# Patient Record
Sex: FEMALE | Race: OTHER | Employment: UNEMPLOYED | URBAN - METROPOLITAN AREA
[De-identification: names, ages, dates, MRNs, and addresses within clinical notes are randomized per-mention and may not be internally consistent; named-entity substitution may affect disease eponyms.]

---

## 2017-01-15 ENCOUNTER — APPOINTMENT (OUTPATIENT)
Dept: GENERAL RADIOLOGY | Age: 77
End: 2017-01-15
Attending: EMERGENCY MEDICINE
Payer: MEDICARE

## 2017-01-15 ENCOUNTER — HOSPITAL ENCOUNTER (OUTPATIENT)
Age: 77
Setting detail: OBSERVATION
Discharge: HOME | End: 2017-01-19
Attending: EMERGENCY MEDICINE | Admitting: HOSPITALIST
Payer: MEDICARE

## 2017-01-15 ENCOUNTER — APPOINTMENT (OUTPATIENT)
Dept: CT IMAGING | Age: 77
End: 2017-01-15
Attending: EMERGENCY MEDICINE
Payer: MEDICARE
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
## 2017-01-15 DIAGNOSIS — M17.11 OSTEOARTHRITIS OF RIGHT KNEE, UNSPECIFIED OSTEOARTHRITIS TYPE: ICD-10-CM

## 2017-01-15 DIAGNOSIS — M25.461 KNEE EFFUSION, RIGHT: ICD-10-CM

## 2017-01-15 PROBLEM — R26.89 IMPAIRED GAIT AND MOBILITY: Status: ACTIVE | Noted: 2017-01-15

## 2017-01-15 PROBLEM — I10 ESSENTIAL HYPERTENSION: Status: ACTIVE | Noted: 2017-01-15

## 2017-01-15 PROBLEM — M25.561 ACUTE PAIN OF RIGHT KNEE: Status: ACTIVE | Noted: 2017-01-15

## 2017-01-15 LAB
ALBUMIN SERPL BCP-MCNC: 3.5 G/DL (ref 3.4–5)
ALBUMIN SERPL BCP-MCNC: 4 G/DL (ref 3.4–5)
ALBUMIN/GLOB SERPL: 0.8 {RATIO} (ref 0.8–1.7)
ALBUMIN/GLOB SERPL: 1.1 {RATIO} (ref 0.8–1.7)
ALP SERPL-CCNC: 114 U/L (ref 45–117)
ALP SERPL-CCNC: 118 U/L (ref 45–117)
ALT SERPL-CCNC: 38 U/L (ref 13–56)
ALT SERPL-CCNC: 71 U/L (ref 13–56)
AMYLASE SERPL-CCNC: 23 U/L (ref 25–115)
ANION GAP BLD CALC-SCNC: 9 MMOL/L (ref 3–18)
ANION GAP BLD CALC-SCNC: 9 MMOL/L (ref 3–18)
APPEARANCE UR: ABNORMAL
AST SERPL W P-5'-P-CCNC: 25 U/L (ref 15–37)
AST SERPL W P-5'-P-CCNC: 58 U/L (ref 15–37)
BACTERIA URNS QL MICRO: ABNORMAL /HPF
BASOPHILS # BLD AUTO: 0 K/UL (ref 0–0.1)
BASOPHILS # BLD: 0 % (ref 0–2)
BILIRUB DIRECT SERPL-MCNC: 0.2 MG/DL (ref 0–0.2)
BILIRUB SERPL-MCNC: 0.6 MG/DL (ref 0.2–1)
BILIRUB SERPL-MCNC: 0.6 MG/DL (ref 0.2–1)
BILIRUB UR QL: NEGATIVE
BUN SERPL-MCNC: 15 MG/DL (ref 7–18)
BUN SERPL-MCNC: 16 MG/DL (ref 7–18)
BUN/CREAT SERPL: 22 (ref 12–20)
BUN/CREAT SERPL: 24 (ref 12–20)
CALCIUM SERPL-MCNC: 9.5 MG/DL (ref 8.5–10.1)
CALCIUM SERPL-MCNC: 9.6 MG/DL (ref 8.5–10.1)
CHLORIDE SERPL-SCNC: 107 MMOL/L (ref 100–108)
CHLORIDE SERPL-SCNC: 111 MMOL/L (ref 100–108)
CK MB CFR SERPL CALC: ABNORMAL % (ref 0–4)
CK MB SERPL-MCNC: <0.5 NG/ML (ref 0.5–3.6)
CK SERPL-CCNC: 36 U/L (ref 26–192)
CO2 SERPL-SCNC: 21 MMOL/L (ref 21–32)
CO2 SERPL-SCNC: 23 MMOL/L (ref 21–32)
COLOR UR: YELLOW
CREAT SERPL-MCNC: 0.66 MG/DL (ref 0.6–1.3)
CREAT SERPL-MCNC: 0.68 MG/DL (ref 0.6–1.3)
DIFFERENTIAL METHOD BLD: ABNORMAL
EOSINOPHIL # BLD: 0.1 K/UL (ref 0–0.4)
EOSINOPHIL NFR BLD: 1 % (ref 0–5)
EPITH CASTS URNS QL MICRO: ABNORMAL /LPF (ref 0–5)
ERYTHROCYTE [DISTWIDTH] IN BLOOD BY AUTOMATED COUNT: 15.2 % (ref 11.6–14.5)
GLOBULIN SER CALC-MCNC: 3.5 G/DL (ref 2–4)
GLOBULIN SER CALC-MCNC: 4.4 G/DL (ref 2–4)
GLUCOSE SERPL-MCNC: 133 MG/DL (ref 74–99)
GLUCOSE SERPL-MCNC: 136 MG/DL (ref 74–99)
GLUCOSE UR STRIP.AUTO-MCNC: NEGATIVE MG/DL
HCT VFR BLD AUTO: 36.9 % (ref 35–45)
HGB BLD-MCNC: 12.5 G/DL (ref 12–16)
HGB UR QL STRIP: ABNORMAL
INR PPP: 1 (ref 0.8–1.2)
KETONES UR QL STRIP.AUTO: NEGATIVE MG/DL
LEUKOCYTE ESTERASE UR QL STRIP.AUTO: ABNORMAL
LIPASE SERPL-CCNC: 90 U/L (ref 73–393)
LYMPHOCYTES # BLD AUTO: 13 % (ref 21–52)
LYMPHOCYTES # BLD: 1.3 K/UL (ref 0.9–3.6)
MCH RBC QN AUTO: 26.9 PG (ref 24–34)
MCHC RBC AUTO-ENTMCNC: 33.9 G/DL (ref 31–37)
MCV RBC AUTO: 79.4 FL (ref 74–97)
MONOCYTES # BLD: 0.8 K/UL (ref 0.05–1.2)
MONOCYTES NFR BLD AUTO: 8 % (ref 3–10)
MUCOUS THREADS URNS QL MICRO: ABNORMAL /LPF
NEUTS SEG # BLD: 7.4 K/UL (ref 1.8–8)
NEUTS SEG NFR BLD AUTO: 78 % (ref 40–73)
NITRITE UR QL STRIP.AUTO: POSITIVE
PH UR STRIP: 5.5 [PH] (ref 5–8)
PLATELET # BLD AUTO: 257 K/UL (ref 135–420)
PMV BLD AUTO: 10 FL (ref 9.2–11.8)
POTASSIUM SERPL-SCNC: 3.9 MMOL/L (ref 3.5–5.5)
POTASSIUM SERPL-SCNC: 3.9 MMOL/L (ref 3.5–5.5)
PROT SERPL-MCNC: 7.5 G/DL (ref 6.4–8.2)
PROT SERPL-MCNC: 7.9 G/DL (ref 6.4–8.2)
PROT UR STRIP-MCNC: ABNORMAL MG/DL
PROTHROMBIN TIME: 12.8 SEC (ref 11.5–15.2)
RBC # BLD AUTO: 4.65 M/UL (ref 4.2–5.3)
RBC #/AREA URNS HPF: ABNORMAL /HPF (ref 0–5)
SODIUM SERPL-SCNC: 139 MMOL/L (ref 136–145)
SODIUM SERPL-SCNC: 141 MMOL/L (ref 136–145)
SP GR UR REFRACTOMETRY: 1.02 (ref 1–1.03)
TROPONIN I SERPL-MCNC: <0.02 NG/ML (ref 0–0.04)
UROBILINOGEN UR QL STRIP.AUTO: 1 EU/DL (ref 0.2–1)
WBC # BLD AUTO: 9.6 K/UL (ref 4.6–13.2)
WBC URNS QL MICRO: ABNORMAL /HPF (ref 0–4)

## 2017-01-15 PROCEDURE — 74011000258 HC RX REV CODE- 258: Performed by: HOSPITALIST

## 2017-01-15 PROCEDURE — 74011250636 HC RX REV CODE- 250/636: Performed by: EMERGENCY MEDICINE

## 2017-01-15 PROCEDURE — 85610 PROTHROMBIN TIME: CPT | Performed by: EMERGENCY MEDICINE

## 2017-01-15 PROCEDURE — 82550 ASSAY OF CK (CPK): CPT | Performed by: HOSPITALIST

## 2017-01-15 PROCEDURE — 73502 X-RAY EXAM HIP UNI 2-3 VIEWS: CPT

## 2017-01-15 PROCEDURE — 80076 HEPATIC FUNCTION PANEL: CPT | Performed by: HOSPITALIST

## 2017-01-15 PROCEDURE — 74011250637 HC RX REV CODE- 250/637: Performed by: EMERGENCY MEDICINE

## 2017-01-15 PROCEDURE — 96361 HYDRATE IV INFUSION ADD-ON: CPT

## 2017-01-15 PROCEDURE — 74011250637 HC RX REV CODE- 250/637: Performed by: HOSPITALIST

## 2017-01-15 PROCEDURE — 99285 EMERGENCY DEPT VISIT HI MDM: CPT

## 2017-01-15 PROCEDURE — 96372 THER/PROPH/DIAG INJ SC/IM: CPT

## 2017-01-15 PROCEDURE — L1830 KO IMMOB CANVAS LONG PRE OTS: HCPCS

## 2017-01-15 PROCEDURE — 73562 X-RAY EXAM OF KNEE 3: CPT

## 2017-01-15 PROCEDURE — 93005 ELECTROCARDIOGRAM TRACING: CPT

## 2017-01-15 PROCEDURE — 74011250636 HC RX REV CODE- 250/636: Performed by: HOSPITALIST

## 2017-01-15 PROCEDURE — 83690 ASSAY OF LIPASE: CPT | Performed by: HOSPITALIST

## 2017-01-15 PROCEDURE — 96374 THER/PROPH/DIAG INJ IV PUSH: CPT

## 2017-01-15 PROCEDURE — 80048 BASIC METABOLIC PNL TOTAL CA: CPT | Performed by: HOSPITALIST

## 2017-01-15 PROCEDURE — 73700 CT LOWER EXTREMITY W/O DYE: CPT

## 2017-01-15 PROCEDURE — 99218 HC RM OBSERVATION: CPT

## 2017-01-15 PROCEDURE — 85025 COMPLETE CBC W/AUTO DIFF WBC: CPT | Performed by: EMERGENCY MEDICINE

## 2017-01-15 PROCEDURE — 80053 COMPREHEN METABOLIC PANEL: CPT | Performed by: EMERGENCY MEDICINE

## 2017-01-15 PROCEDURE — 82150 ASSAY OF AMYLASE: CPT | Performed by: HOSPITALIST

## 2017-01-15 PROCEDURE — 96375 TX/PRO/DX INJ NEW DRUG ADDON: CPT

## 2017-01-15 PROCEDURE — 81001 URINALYSIS AUTO W/SCOPE: CPT | Performed by: HOSPITALIST

## 2017-01-15 RX ORDER — HEPARIN SODIUM 5000 [USP'U]/ML
5000 INJECTION, SOLUTION INTRAVENOUS; SUBCUTANEOUS EVERY 8 HOURS
Status: DISCONTINUED | OUTPATIENT
Start: 2017-01-15 | End: 2017-01-19 | Stop reason: HOSPADM

## 2017-01-15 RX ORDER — ASPIRIN 325 MG
1 TABLET, DELAYED RELEASE (ENTERIC COATED) ORAL
COMMUNITY

## 2017-01-15 RX ORDER — MORPHINE SULFATE 4 MG/ML
4 INJECTION, SOLUTION INTRAMUSCULAR; INTRAVENOUS
Status: COMPLETED | OUTPATIENT
Start: 2017-01-15 | End: 2017-01-15

## 2017-01-15 RX ORDER — ONDANSETRON 4 MG/1
4 TABLET, ORALLY DISINTEGRATING ORAL
Status: DISCONTINUED | OUTPATIENT
Start: 2017-01-15 | End: 2017-01-19 | Stop reason: HOSPADM

## 2017-01-15 RX ORDER — MORPHINE SULFATE 2 MG/ML
2 INJECTION, SOLUTION INTRAMUSCULAR; INTRAVENOUS
Status: DISCONTINUED | OUTPATIENT
Start: 2017-01-15 | End: 2017-01-19 | Stop reason: HOSPADM

## 2017-01-15 RX ORDER — AMLODIPINE BESYLATE 10 MG/1
10 TABLET ORAL DAILY
Status: DISCONTINUED | OUTPATIENT
Start: 2017-01-15 | End: 2017-01-19 | Stop reason: HOSPADM

## 2017-01-15 RX ORDER — LORAZEPAM 0.5 MG/1
0.5 TABLET ORAL
Status: DISCONTINUED | OUTPATIENT
Start: 2017-01-15 | End: 2017-01-19 | Stop reason: HOSPADM

## 2017-01-15 RX ORDER — PRAVASTATIN SODIUM 20 MG/1
20 TABLET ORAL DAILY
Status: DISCONTINUED | OUTPATIENT
Start: 2017-01-15 | End: 2017-01-19 | Stop reason: HOSPADM

## 2017-01-15 RX ORDER — AMLODIPINE BESYLATE 10 MG/1
1 TABLET ORAL DAILY
COMMUNITY
Start: 2016-12-05

## 2017-01-15 RX ORDER — ATENOLOL 25 MG/1
1 TABLET ORAL DAILY
COMMUNITY
Start: 2016-11-01

## 2017-01-15 RX ORDER — ACETAMINOPHEN 500 MG
1000 TABLET ORAL
Status: DISCONTINUED | OUTPATIENT
Start: 2017-01-15 | End: 2017-01-19 | Stop reason: HOSPADM

## 2017-01-15 RX ORDER — SODIUM CHLORIDE 0.9 % (FLUSH) 0.9 %
5-10 SYRINGE (ML) INJECTION EVERY 8 HOURS
Status: DISCONTINUED | OUTPATIENT
Start: 2017-01-15 | End: 2017-01-19 | Stop reason: HOSPADM

## 2017-01-15 RX ORDER — DEXTROSE MONOHYDRATE AND SODIUM CHLORIDE 5; .9 G/100ML; G/100ML
125 INJECTION, SOLUTION INTRAVENOUS CONTINUOUS
Status: DISCONTINUED | OUTPATIENT
Start: 2017-01-15 | End: 2017-01-18

## 2017-01-15 RX ORDER — ONDANSETRON 2 MG/ML
4 INJECTION INTRAMUSCULAR; INTRAVENOUS
Status: DISCONTINUED | OUTPATIENT
Start: 2017-01-15 | End: 2017-01-19 | Stop reason: HOSPADM

## 2017-01-15 RX ORDER — ACETAMINOPHEN 500 MG
500 TABLET ORAL
COMMUNITY
End: 2017-01-19

## 2017-01-15 RX ORDER — LEVOTHYROXINE SODIUM 50 UG/1
50 TABLET ORAL
COMMUNITY

## 2017-01-15 RX ORDER — DICLOFENAC SODIUM 10 MG/G
2 GEL TOPICAL 2 TIMES DAILY
COMMUNITY

## 2017-01-15 RX ORDER — SENNOSIDES 8.6 MG/1
2 TABLET ORAL
Status: DISCONTINUED | OUTPATIENT
Start: 2017-01-15 | End: 2017-01-19 | Stop reason: HOSPADM

## 2017-01-15 RX ORDER — FLUTICASONE PROPIONATE 50 MCG
2 SPRAY, SUSPENSION (ML) NASAL DAILY
COMMUNITY
Start: 2016-11-01

## 2017-01-15 RX ORDER — ALENDRONATE SODIUM 70 MG/1
70 TABLET ORAL
COMMUNITY

## 2017-01-15 RX ORDER — DIPHENHYDRAMINE HCL 25 MG
25 CAPSULE ORAL
Status: DISCONTINUED | OUTPATIENT
Start: 2017-01-15 | End: 2017-01-19 | Stop reason: HOSPADM

## 2017-01-15 RX ORDER — TRAMADOL HYDROCHLORIDE 50 MG/1
50 TABLET ORAL
Status: DISCONTINUED | OUTPATIENT
Start: 2017-01-15 | End: 2017-01-19 | Stop reason: HOSPADM

## 2017-01-15 RX ORDER — OMEPRAZOLE 20 MG/1
20 CAPSULE, DELAYED RELEASE ORAL 2 TIMES DAILY
COMMUNITY

## 2017-01-15 RX ORDER — ASPIRIN 81 MG/1
81 TABLET ORAL DAILY
COMMUNITY

## 2017-01-15 RX ORDER — TRAMADOL HYDROCHLORIDE 50 MG/1
50 TABLET ORAL
Status: DISCONTINUED | OUTPATIENT
Start: 2017-01-15 | End: 2017-01-15

## 2017-01-15 RX ORDER — NALOXONE HYDROCHLORIDE 0.4 MG/ML
0.4 INJECTION, SOLUTION INTRAMUSCULAR; INTRAVENOUS; SUBCUTANEOUS AS NEEDED
Status: DISCONTINUED | OUTPATIENT
Start: 2017-01-15 | End: 2017-01-19 | Stop reason: HOSPADM

## 2017-01-15 RX ORDER — ASPIRIN 81 MG/1
81 TABLET ORAL DAILY
Status: DISCONTINUED | OUTPATIENT
Start: 2017-01-15 | End: 2017-01-19 | Stop reason: HOSPADM

## 2017-01-15 RX ORDER — LOSARTAN POTASSIUM 50 MG/1
100 TABLET ORAL DAILY
Status: DISCONTINUED | OUTPATIENT
Start: 2017-01-15 | End: 2017-01-19 | Stop reason: HOSPADM

## 2017-01-15 RX ORDER — PRAVASTATIN SODIUM 20 MG/1
1 TABLET ORAL DAILY
COMMUNITY
Start: 2016-10-25

## 2017-01-15 RX ORDER — LEVOTHYROXINE SODIUM 50 UG/1
50 TABLET ORAL
Status: DISCONTINUED | OUTPATIENT
Start: 2017-01-15 | End: 2017-01-19 | Stop reason: HOSPADM

## 2017-01-15 RX ORDER — PANTOPRAZOLE SODIUM 40 MG/1
40 TABLET, DELAYED RELEASE ORAL
Status: DISCONTINUED | OUTPATIENT
Start: 2017-01-15 | End: 2017-01-19 | Stop reason: HOSPADM

## 2017-01-15 RX ORDER — HEPARIN SODIUM 5000 [USP'U]/ML
5000 INJECTION, SOLUTION INTRAVENOUS; SUBCUTANEOUS EVERY 8 HOURS
Status: DISCONTINUED | OUTPATIENT
Start: 2017-01-15 | End: 2017-01-15

## 2017-01-15 RX ORDER — ATENOLOL 25 MG/1
25 TABLET ORAL DAILY
Status: DISCONTINUED | OUTPATIENT
Start: 2017-01-15 | End: 2017-01-19 | Stop reason: HOSPADM

## 2017-01-15 RX ORDER — SODIUM CHLORIDE 0.9 % (FLUSH) 0.9 %
5-10 SYRINGE (ML) INJECTION AS NEEDED
Status: DISCONTINUED | OUTPATIENT
Start: 2017-01-15 | End: 2017-01-19 | Stop reason: HOSPADM

## 2017-01-15 RX ORDER — LOSARTAN POTASSIUM 100 MG/1
1 TABLET ORAL DAILY
COMMUNITY
Start: 2016-10-13

## 2017-01-15 RX ORDER — HYDROCODONE BITARTRATE AND ACETAMINOPHEN 5; 325 MG/1; MG/1
2 TABLET ORAL
Status: COMPLETED | OUTPATIENT
Start: 2017-01-15 | End: 2017-01-15

## 2017-01-15 RX ADMIN — HEPARIN SODIUM 5000 UNITS: 5000 INJECTION, SOLUTION INTRAVENOUS; SUBCUTANEOUS at 15:49

## 2017-01-15 RX ADMIN — LOSARTAN POTASSIUM 100 MG: 25 TABLET, FILM COATED ORAL at 15:47

## 2017-01-15 RX ADMIN — HEPARIN SODIUM 5000 UNITS: 5000 INJECTION, SOLUTION INTRAVENOUS; SUBCUTANEOUS at 23:41

## 2017-01-15 RX ADMIN — ATENOLOL 25 MG: 50 TABLET ORAL at 14:41

## 2017-01-15 RX ADMIN — PANTOPRAZOLE SODIUM 40 MG: 40 TABLET, DELAYED RELEASE ORAL at 20:45

## 2017-01-15 RX ADMIN — SENNOSIDES 17.2 MG: 8.6 TABLET, FILM COATED ORAL at 23:41

## 2017-01-15 RX ADMIN — Medication 4 MG: at 04:12

## 2017-01-15 RX ADMIN — ASPIRIN 81 MG: 81 TABLET, COATED ORAL at 14:41

## 2017-01-15 RX ADMIN — Medication 10 ML: at 08:06

## 2017-01-15 RX ADMIN — HEPARIN SODIUM 5000 UNITS: 5000 INJECTION, SOLUTION INTRAVENOUS; SUBCUTANEOUS at 08:05

## 2017-01-15 RX ADMIN — PRAVASTATIN SODIUM 20 MG: 20 TABLET ORAL at 14:41

## 2017-01-15 RX ADMIN — HYDROCODONE BITARTRATE AND ACETAMINOPHEN 2 TABLET: 5; 325 TABLET ORAL at 06:00

## 2017-01-15 RX ADMIN — LEVOTHYROXINE SODIUM 50 MCG: 50 TABLET ORAL at 08:05

## 2017-01-15 RX ADMIN — Medication 10 ML: at 14:41

## 2017-01-15 RX ADMIN — ONDANSETRON 4 MG: 4 TABLET, ORALLY DISINTEGRATING ORAL at 10:10

## 2017-01-15 RX ADMIN — AMLODIPINE BESYLATE 10 MG: 10 TABLET ORAL at 14:41

## 2017-01-15 RX ADMIN — Medication 10 ML: at 23:41

## 2017-01-15 RX ADMIN — DEXTROSE MONOHYDRATE AND SODIUM CHLORIDE 125 ML/HR: 5; .9 INJECTION, SOLUTION INTRAVENOUS at 20:35

## 2017-01-15 RX ADMIN — ONDANSETRON HYDROCHLORIDE 4 MG: 2 SOLUTION INTRAMUSCULAR; INTRAVENOUS at 20:45

## 2017-01-15 NOTE — IP AVS SNAPSHOT
Current Discharge Medication List  
  
Take these medications at their scheduled times Dose & Instructions Dispensing Information Comments Morning Noon Evening Bedtime  
 alendronate 70 mg tablet Commonly known as:  FOSAMAX Your next dose is: Today, Tomorrow Other:  ____________ Dose:  70 mg Take 70 mg by mouth every seven (7) days. Refills:  0  
     
   
   
   
  
 amLODIPine 10 mg tablet Commonly known as:  Bri Colin Your next dose is: Today, Tomorrow Other:  ____________ Dose:  1 Tab Take 1 Tab by mouth daily. Refills:  0  
     
   
   
   
  
 aspirin delayed-release 81 mg tablet Your next dose is: Today, Tomorrow Other:  ____________ Dose:  81 mg Take 81 mg by mouth daily. Refills:  0  
     
   
   
   
  
 atenolol 25 mg tablet Commonly known as:  TENORMIN Your next dose is: Today, Tomorrow Other:  ____________ Dose:  1 Tab Take 1 Tab by mouth daily. Refills:  0 Cholecalciferol (Vitamin D3) 50,000 unit Cap Your next dose is: Today, Tomorrow Other:  ____________ Dose:  1 Cap Take 1 Cap by mouth every seven (7) days. Refills:  0  
     
   
   
   
  
 fluticasone 50 mcg/actuation nasal spray Commonly known as:  Caffie Euler Your next dose is: Today, Tomorrow Other:  ____________ Dose:  2 Spray 2 Sprays by Both Nostrils route daily. Refills:  0  
     
   
   
   
  
 levothyroxine 50 mcg tablet Commonly known as:  SYNTHROID Your next dose is: Today, Tomorrow Other:  ____________ Dose:  50 mcg Take 50 mcg by mouth Daily (before breakfast). Refills:  0  
     
   
   
   
  
 losartan 100 mg tablet Commonly known as:  COZAAR Your next dose is: Today, Tomorrow Other:  ____________ Dose:  1 Tab Take 1 Tab by mouth daily. Refills:  0 omeprazole 20 mg capsule Commonly known as:  PRILOSEC Your next dose is: Today, Tomorrow Other:  ____________ Dose:  20 mg Take 20 mg by mouth two (2) times a day. Refills:  0  
     
   
   
   
  
 pravastatin 20 mg tablet Commonly known as:  PRAVACHOL Your next dose is: Today, Tomorrow Other:  ____________ Dose:  1 Tab Take 1 Tab by mouth daily. Refills:  0  
     
   
   
   
  
 senna 8.6 mg tablet Commonly known as:  Adrian Moreira Your next dose is: Today, Tomorrow Other:  ____________ Dose:  2 Tab Take 2 Tabs by mouth nightly. Quantity:  60 Tab Refills:  0 VOLTAREN 1 % Gel Generic drug:  diclofenac Your next dose is: Today, Tomorrow Other:  ____________ Dose:  2 g Apply 2 g to affected area two (2) times a day. Indications: OSTEOARTHRITIS OF THE KNEE, left knee Refills:  0 Take these medications as needed Dose & Instructions Dispensing Information Comments Morning Noon Evening Bedtime  
 acetaminophen 500 mg tablet Commonly known as:  80 Jr Kiarra Holley Se Your next dose is: Today, Tomorrow Other:  ____________ Dose:  1000 mg Take 2 Tabs by mouth three (3) times daily as needed. Indications: ARTHRITIC PAIN Quantity:  60 Tab Refills:  0 LORazepam 0.5 mg tablet Commonly known as:  ATIVAN Your next dose is: Today, Tomorrow Other:  ____________ Dose:  0.5 mg Take 1 Tab by mouth every six (6) hours as needed. Max Daily Amount: 2 mg. Indications: ANXIETY Quantity:  30 Tab Refills:  0  
     
   
   
   
  
 ondansetron 4 mg disintegrating tablet Commonly known as:  ZOFRAN ODT Your next dose is: Today, Tomorrow Other:  ____________ Dose:  4 mg Take 1 Tab by mouth every eight (8) hours as needed. Quantity:  15 Tab Refills:  0  
     
   
   
   
  
 traMADol 50 mg tablet Commonly known as:  ULTRAM  
   
Your next dose is: Today, Tomorrow Other:  ____________ Dose:  50 mg Take 1 Tab by mouth every six (6) hours as needed. Max Daily Amount: 200 mg. Indications: PAIN Quantity:  30 Tab Refills:  0 Where to Get Your Medications Information about where to get these medications is not yet available ! Ask your nurse or doctor about these medications  
  acetaminophen 500 mg tablet LORazepam 0.5 mg tablet  
 ondansetron 4 mg disintegrating tablet  
 senna 8.6 mg tablet  
 traMADol 50 mg tablet

## 2017-01-15 NOTE — ED TRIAGE NOTES
Pt arrived via medic complaining complaining of right knee swelling and pain after falling down the stairs Friday night, patient denies LOC or Head Injury. Patient able to move her her right foot but reports that she is unable to bear weight on it.

## 2017-01-15 NOTE — H&P
History & Physical    Patient: Arias Duggan MRN: [id number]  [facility]: [id number]    YOB: 1940  Age: 77 y.o. Sex: female      DOA: 1/15/2017       HPI:     Arias Duggan is a 77 y.o. female with a past medical history of hypertension, hypothyroid and GERD presented to the emergency room in the a.m. of 1/15/2017 with a complaint of   pain on weightbearing right lower extremity. The patient states she fell down the steps 1/13/2017 at home. Ms. Aruna Negron denies that she had any dizziness, headache, SOB or chest pain. She states she had a trip fall and fell onto the steps. Denies head injury, flank pain, back or neck pain. She has had increased difficulty trying to walk, now unable to put weight on her right leg. She has no assistive devices at home, and cannot walk. On evaluation in emergency room the patient was found to have moderate tenderness at her right knee. A hip x-ray was done for referred pain, and demonstrated DDD of the lumbar spine and SI joints, no definite fracture. X-ray the right knee demonstrated a large dense suprapatellar joint effusion with a possible fracture along the lateral tibial plateau and tricompartmental osteoarthrosis with chondrocalcinosis. CT scan of the right knee demonstrated no evidence of definiteacute fracture. The appearance of a fracture on the x-rays appears to represent an unusual morphology of the posterior lateral tibial plateau with superimposed periarticular spurring. A large suprapatellar joint effusion is again mentioned with chondrocalcinosis. Past Medical History   Diagnosis Date    GERD (gastroesophageal reflux disease)     High cholesterol     Hypertension     Hypothyroid        History reviewed. No pertinent past surgical history. History reviewed. No pertinent family history.     Social History     Social History    Marital status:      Spouse name: N/A    Number of children: 4    Years of education: N/A     Social History Main Topics    Smoking status: Never Smoker    Smokeless tobacco: None    Alcohol use No    Drug use: No    Sexual activity: Not Asked     Other Topics Concern    None     Social History Narrative    Lives with Daughter in [place] to [facility] from [facility] 1980's    Retired    4 daughters       Prior to Admission medications    Medication Sig Start Date End Date Taking? Authorizing Provider   amLODIPine (NORVASC) 10 mg tablet Take 1 Tab by mouth daily. 12/5/16  Yes Historical Provider   atenolol (TENORMIN) 25 mg tablet Take 1 Tab by mouth daily. 11/1/16  Yes Historical Provider   losartan (COZAAR) 100 mg tablet Take 1 Tab by mouth daily. 10/13/16  Yes Historical Provider   pravastatin (PRAVACHOL) 20 mg tablet Take 1 Tab by mouth daily. 10/25/16  Yes Historical Provider   aspirin delayed-release 81 mg tablet Take 81 mg by mouth daily. Yes Historical Provider   levothyroxine (SYNTHROID) 50 mcg tablet Take 50 mcg by mouth Daily (before breakfast). Yes Historical Provider   omeprazole (PRILOSEC) 20 mg capsule Take 20 mg by mouth two (2) times a day. Yes Historical Provider   fluticasone (FLONASE) 50 mcg/actuation nasal spray 2 Sprays by Both Nostrils route daily. 11/1/16   Historical Provider   alendronate (FOSAMAX) 70 mg tablet Take 70 mg by mouth every seven (7) days. Historical Provider   acetaminophen (TYLENOL EXTRA STRENGTH) 500 mg tablet Take 500 mg by mouth every six (6) hours as needed for Pain. Historical Provider   Cholecalciferol, Vitamin D3, 50,000 unit cap Take 1 Cap by mouth every seven (7) days. Historical Provider   diclofenac (VOLTAREN) 1 % gel Apply 2 g to affected area two (2) times a day.  Indications: OSTEOARTHRITIS OF THE KNEE, left knee    Historical Provider     No Known Allergies       Physical Exam:      Visit Vitals    /79 (BP 1 Location: Right arm, BP Patient Position: At rest)    Pulse 73    Temp 97 °F (36.1 °C)    Resp 16    Wt 54.4 kg (120 lb)  SpO2 97%       Physical Exam:  GENERAL: alert, cooperative, mild distress right knee pain, pale  HEENT: atraumatic scalp, FLAVIA, no focal deficits, mucous membranes moist, speech clear, no JVD, neck supple, no edema or ecchymosis  Chest:  No ecchymosis or tenderness, lungs clear to bases, HR regular  Abdomen: soft, no ecchymosis, non tender, BS+, no bruit  Back: no areas of vertebral tenderness, no ecchymosis or edema, no para vertebral spasm  EXT: right knee tenderness, + effusion, calf and foot non tender, + pulses, LLE non tender, + pulses    Lab/Data Review:  Labs: Results:       Chemistry Recent Labs      01/15/17   0155   GLU  133*   NA  141   K  3.9   CL  111*   CO2  21   BUN  16   CREA  0.66   CA  9.5   AGAP  9   BUCR  24*   AP  114   TP  7.5   ALB  4.0   GLOB  3.5   AGRAT  1.1      CBC w/Diff Recent Labs      01/15/17   0155   WBC  9.6   RBC  4.65   HGB  12.5   HCT  36.9   PLT  257   GRANS  78*   LYMPH  13*   EOS  1      Coagulation Recent Labs      01/15/17   0155   PTP  12.8   INR  1.0       Iron/Ferritin No results for input(s): IRON in the last 72 hours. No lab exists for component: TIBCCALC   BNP No results for input(s): BNPP in the last 72 hours. Cardiac Enzymes No results for input(s): CPK, CKND1, MERNA in the last 72 hours. No lab exists for component: CKRMB, TROIP   Liver Enzymes Recent Labs      01/15/17   0155   TP  7.5   ALB  4.0   AP  114   SGOT  25      Thyroid Studies No results found for: T4, T3U, TSH, TSHEXT       All Micro Results     None          Imaging Reviewed:  Right hip  Study Result      Description: Right hip, two-view     Clinical Indication: Hip pain after falling down the stairs Friday night     Comparison: None.     Findings: Limited by body habitus. No definite fracture or dislocation. Moderate degenerative disc disease within the lower lumbar spine and the SI  joints.     IMPRESSION  Impression:      Somewhat limited by body habitus.  No definite hip fracture.           Imaging      XR HIP RT W OR WO PELV 2-3 VWS (Order #[id number]) on 1/15/2017 - Imaging Information       Right knee    Study Result      Description: Right knee, 3 view     Clinical Indication: Pain after falling down the stairs     Comparison: None.     Findings: Cortical contour appears interrupted along the lateral aspect of the  tibial plateau near the tibiofibular syndesmosis on the oblique view. Moderate  tricompartmental periarticular spurring. Chondrocalcinosis is present. There is  a large, dense suprapatellar joint effusion.     IMPRESSION  Impression:      Large, dense suprapatellar joint effusion with possible fracture along the  lateral tibial plateau. CT could confirm.     Tricompartmental osteoarthrosis.     Chondrocalcinosis.            Imaging      XR KNEE RT 3 V (Order #[id number]) on 1/15/2017 - Imaging Information     CT Right knee:    Clinical Indication: Pain after fall. Same-day radiographs showed a large  suprapatellar joint effusion and possible tibial plateau fracture.     Comparison: Correlation with radiograph, January 15, 2017.     Findings:      Moderate tricompartmental osteoarthrosis evidenced by periarticular spurring,  subcortical cyst formation in moderate eburnation of the medial compartment. The  appearance of a fracture on same-day radiographs appears to represent an unusual  morphology of the posterior lateral tibial plateau with superimposed  periarticular spurring. No fracture is seen. There is a large suprapatellar  joint effusion. Chondrocalcinosis is present.     The visualized soft tissues show mild atherosclerosis. No perimuscular or  intramuscular edema. No skin thickening or subcutaneous edema. Internal  derangement is not excludable on the basis of this study.     IMPRESSION  Impression:      Large suprapatellar joint effusion of the right knee. No evidence of fracture. Internal derangement not excluded with this technique.     Multicompartmental osteoarthrosis. Chondrocalcinosis.              Imaging      CT KNEE RT WO CONT (Order #[id number]) on 1/15/2017 - Imaging Information       Assessment:   Principal Problem:    Effusion of knee joint right (1/15/2017)    Active Problems:    Impaired gait and mobility (1/15/2017)      Overview: Unable to ambulate, Pain on walking after fall at home onto right knee      Acute pain of right knee (1/15/2017)      Essential hypertension (1/15/2017)      GERD (gastroesophageal reflux disease) ()      Hypothyroid ()      Plan:   Admit patient for observation  Right knee immobilizer  Bilateral support compression stockings  Control chronic medical conditions  Continue amlodipine and losartan  Continue levothyroxine  Discussed case with Dr. Jean Pierre Du, who will consult  PT/OT evaluation, NWB RLE,  for appropriate DME  DVT and GI prophylaxis    Full code    Disposition  Home with home health    Maria C Van  1/15/2017, 8:10 AM

## 2017-01-15 NOTE — Clinical Note
Patient Class[de-identified] Observation [298] Type of Bed: Medical [8] Reason for Observation: inability to ambulate, R knee effusion, fall Admitting Physician: Rey Zazueta [[id number]] Attending Physician: Rey Zazueta [[id number]] Diagnosis: Impaired gait and mobility

## 2017-01-15 NOTE — PROGRESS NOTES
PROGRESS NOTE      Patient: Malachi Isabel  MRN: [id number]  SSN: [id number]   YOB: 1940  Age: 77 y.o. Sex: female     Admit Date: 1/15/2017 LOS:  LOS: 0 days      Consultants following patients: Orthopedics    Case discussed with:  []Patient  []Family  [x]Nursing  []Case Management    Dx RIGHT KNEE STRAIN, RIGHT KNEE OA/CONTUSION      ASSESSMENT:     Camille Rosario IS  Doing fairly well. She has a right hinged knee brace in place. she   at this time. No additional complaints other than surgical incision pain: her pain is controlled at this time. Tolerating fluids and solids. Malachi Isabel reports: No difficulty voiding urine and No difficulty passing flatus. No overnight events and remains hemodynamically stable after the surgery. PLAN:     1) Orthopaedic Diagnoses:  Right knee contusion, knee effusion, knee OA       Knee brace ordered           DVT Prophylaxis :   SCD's, Encourage mobilization, Encourage active ankle         DF/PF motion for endogenous fibrinolysis  Aspirin          Incision Care:  Dressing Change/Reinforce if necessary:          Incentive Spirometry: Encourage its use to minimize atelectasis  2) Pain management -  Continue Current Pain Management Narcotic Analgesia  3) Antibiotics:    None at this time   23 hours perioperative dosing prophylaxis for infection.    4) PT/OT/ Intervention:   PT/OT : PWB with walker/supervision   DC disposition: Home, TBD  5)  Medical Diagnoses: Internal Medicine/Consultants: Malachi Isabel is on medicine service with Ortho consultation:     Patient Active Problem List   Diagnosis Code    Impaired gait and mobility R26.89    Effusion of knee joint right M25.461    Acute pain of right knee M25.561    Essential hypertension I10    GERD (gastroesophageal reflux disease) K21.9    Hypothyroid E03.9    Primary osteoarthritis of right knee M17.11    Knee strain S86.919A       Subjective:     No complaints  Denies Headache, N/V, CP, SOB, ABD or Calf pain.     Objective:      Visit Vitals    /70    Pulse (!) 56    Temp 97 °F (36.1 °C)    Resp 16    Wt 120 lb (54.4 kg)    SpO2 98%    Breastfeeding No       Intake/Output Summary (Last 24 hours) at 01/16/17 1254  Last data filed at 01/16/17 0659   Gross per 24 hour   Intake             1300 ml   Output                0 ml   Net             1300 ml       Alert, Oriented x 3,no distress,no involuntary movements    Knees:  Right       Cutaneous: Skin intact, no abrasions, blisters, wounds, erythema       Effusion: Is MILD RIGHT KNEE       Crepitus:  Not tested or conducted due to tenderness        Tenderness: Tenderness: Medial joint line, Medial hamstring and Medial retinaculum        Alignment of Knee:   Not tested or conducted due to tenderness        ROM: diminished range with pain       Fullness or swelling: None to popliteal fossa region,         Stability: No instability to anterior, posterior, varus, valgus stress testing       Trust:  Not tested or conducted due to tenderness        Neuro: knee flexion and knee extension, Extensor mechanism is intact    DIAGNOSTIC IMAGING      Lab/Data Reviewed:       CMP:   Lab Results   Component Value Date/Time     01/15/2017 07:55 PM    K 3.9 01/15/2017 07:55 PM     01/15/2017 07:55 PM    CO2 23 01/15/2017 07:55 PM    AGAP 9 01/15/2017 07:55 PM     (H) 01/15/2017 07:55 PM    BUN 15 01/15/2017 07:55 PM    CREA 0.68 01/15/2017 07:55 PM    GFRAA >60 01/15/2017 07:55 PM    GFRNA >60 01/15/2017 07:55 PM    CA 9.6 01/15/2017 07:55 PM    ALB 3.5 01/15/2017 07:55 PM    TP 7.9 01/15/2017 07:55 PM    GLOB 4.4 (H) 01/15/2017 07:55 PM    AGRAT 0.8 01/15/2017 07:55 PM    SGOT 58 (H) 01/15/2017 07:55 PM    ALT 71 (H) 01/15/2017 07:55 PM     CBC:   Lab Results   Component Value Date/Time    WBC 6.7 01/16/2017 11:33 AM    HGB 11.0 (L) 01/16/2017 11:33 AM    HCT 34.2 (L) 01/16/2017 11:33 AM     01/16/2017 11:33 AM     COAGS: No results found for: APTT, PTP, INR    All Micro Results     None           Past Medical History   Diagnosis Date    GERD (gastroesophageal reflux disease)     High cholesterol     Hypertension     Hypothyroid      History reviewed. No pertinent past surgical history.   No Known Allergies  Current Facility-Administered Medications   Medication Dose Route Frequency Provider Last Rate Last Dose    acetaminophen (TYLENOL) tablet 1,000 mg  1,000 mg Oral TID PRN Jia Doyne, DO   1,000 mg at 01/16/17 0331    amLODIPine (NORVASC) tablet 10 mg  10 mg Oral DAILY Jia Doyne, DO   10 mg at 01/16/17 1004    aspirin delayed-release tablet 81 mg  81 mg Oral DAILY Wiseman Doyne, DO   81 mg at 01/16/17 0901    atenolol (TENORMIN) tablet 25 mg  25 mg Oral DAILY Wiseman Doyne, DO   25 mg at 01/16/17 0901    levothyroxine (SYNTHROID) tablet 50 mcg  50 mcg Oral ACB Wiseman Doyne, DO   50 mcg at 01/16/17 5180    losartan (COZAAR) tablet 100 mg  100 mg Oral DAILY Wiseman Doyne, DO   100 mg at 01/16/17 1004    pantoprazole (PROTONIX) tablet 40 mg  40 mg Oral QHS Jia Doyne, DO   40 mg at 01/15/17 2045    pravastatin (PRAVACHOL) tablet 20 mg  20 mg Oral DAILY Jia Doyne, DO   20 mg at 01/16/17 0901    sodium chloride (NS) flush 5-10 mL  5-10 mL IntraVENous Q8H Jia Doyne, DO   Stopped at 01/16/17 0600    sodium chloride (NS) flush 5-10 mL  5-10 mL IntraVENous PRN Wiseman Doyne, DO        morphine injection 2 mg  2 mg IntraVENous Q3H PRN Wiseman Doyne, DO        naloxone [facility]) injection 0.4 mg  0.4 mg IntraVENous PRN Jia Doyne, DO        diphenhydrAMINE (BENADRYL) capsule 25 mg  25 mg Oral Q4H PRN Jia Doyne, DO        ondansetron (ZOFRAN ODT) tablet 4 mg  4 mg Oral Q8H PRN Wiseman Doyne, DO   4 mg at 01/15/17 1010    senna (SENOKOT) tablet 17.2 mg  2 Tab Oral QHS Jia Liu DO   17.2 mg at 01/15/17 5291    LORazepam (ATIVAN) tablet 0.5 mg  0.5 mg Oral Q6H PRN Dennie Radish, DO        heparin (porcine) injection 5,000 Units  5,000 Units SubCUTAneous Q8H Dennie Radish, DO   5,000 Units at 01/16/17 0902    dextrose 5% and 0.9% NaCl infusion  125 mL/hr IntraVENous CONTINUOUS Dennie Radish,  mL/hr at 01/16/17 0449 125 mL/hr at 01/16/17 0449    ondansetron (ZOFRAN) injection 4 mg  4 mg IntraVENous Q6H PRN Dennie Radish, DO   4 mg at 01/15/17 2045    traMADol (ULTRAM) tablet 50 mg  50 mg Oral Q6H PRN Dennie Radish, DO           Matty Juarez MD  1/16/2017  12:54 PM

## 2017-01-15 NOTE — IP AVS SNAPSHOT
Yana Santamaria 
 
 
 [address] [address] [facility] Patient: Hasmukh Vázquez MRN: [id number] YNX:[id number] You are allergic to the following No active allergies Recent Documentation Height Weight Breastfeeding? Smoking Status 1.372 m 54.4 kg No Never Smoker Emergency Contacts Name Discharge Info Relation Home Work Mobile Pavan Moctezuma  Child [2] [phone] About your hospitalization You were admitted on:  January 15, 2017 You last received care in the:  [facility] [address] 1979 You were discharged on:  January 19, 2017 Unit phone number:  [phone] Why you were hospitalized Your primary diagnosis was:  Effusion Of Knee Joint Right Your diagnoses also included:  Impaired Gait And Mobility, Acute Pain Of Right Knee, Essential Hypertension, Hypothyroid, Gerd (Gastroesophageal Reflux Disease), Primary Osteoarthritis Of Right Knee, Knee Strain Providers Seen During Your Hospitalizations Provider Role Specialty Primary office phone Lorriane Sandhoff, MD Attending Provider Emergency Medicine [phone] Raj Whiting DO Attending Provider Internal Medicine [phone] Your Primary Care Physician (PCP) Primary Care Physician Office Phone Office Fax [address], [address] [facility] [address] [phone] Follow-up Information Follow up With Details Comments Contact Info Josesito Shrestha MD On 1/23/2017 Appointment at 10:50am. Patient notified. [address] [facility] [address] [place] [id number] [phone] Rudy Armenta MD On 2/1/2017 Appointment at 10:00am. Patient notified [address] [facility] [address] [place] [address] 
[phone] Your Appointments Wednesday February 01, 2017 10:00 AM EST New Patient with Rudy Armenta MD  
[address] [facility], [address] [facility] [place] [facility] ([facility]) [address], [facility] [address] [place] [address]  
[phone] Current Discharge Medication List  
  
START taking these medications Dose & Instructions Dispensing Information Comments Morning Noon Evening Bedtime LORazepam 0.5 mg tablet Commonly known as:  ATIVAN Your next dose is: Today, Tomorrow Other:  _________ Dose:  0.5 mg Take 1 Tab by mouth every six (6) hours as needed. Max Daily Amount: 2 mg. Indications: ANXIETY Quantity:  30 Tab Refills:  0  
     
   
   
   
  
 ondansetron 4 mg disintegrating tablet Commonly known as:  ZOFRAN ODT Your next dose is: Today, Tomorrow Other:  _________ Dose:  4 mg Take 1 Tab by mouth every eight (8) hours as needed. Quantity:  15 Tab Refills:  0  
     
   
   
   
  
 senna 8.6 mg tablet Commonly known as:  Adrian Moreira Your next dose is: Today, Tomorrow Other:  _________ Dose:  2 Tab Take 2 Tabs by mouth nightly. Quantity:  60 Tab Refills:  0  
     
   
   
   
  
 traMADol 50 mg tablet Commonly known as:  ULTRAM  
   
Your next dose is: Today, Tomorrow Other:  _________ Dose:  50 mg Take 1 Tab by mouth every six (6) hours as needed. Max Daily Amount: 200 mg. Indications: PAIN Quantity:  30 Tab Refills:  0 CONTINUE these medications which have CHANGED Dose & Instructions Dispensing Information Comments Morning Noon Evening Bedtime  
 acetaminophen 500 mg tablet Commonly known as:  Jimenez Aponte [facility] What changed:   
- how much to take - when to take this 
- reasons to take this Your next dose is: Today, Tomorrow Other:  _________ Dose:  1000 mg Take 2 Tabs by mouth three (3) times daily as needed. Indications: ARTHRITIC PAIN Quantity:  60 Tab Refills:  0 CONTINUE these medications which have NOT CHANGED Dose & Instructions Dispensing Information Comments Morning Noon Evening Bedtime  
 alendronate 70 mg tablet Commonly known as:  FOSAMAX Your next dose is: Today, Tomorrow Other:  _________ Dose:  70 mg Take 70 mg by mouth every seven (7) days. Refills:  0  
     
   
   
   
  
 amLODIPine 10 mg tablet Commonly known as:  Mcdaniel Cater Your next dose is: Today, Tomorrow Other:  _________ Dose:  1 Tab Take 1 Tab by mouth daily. Refills:  0  
     
   
   
   
  
 aspirin delayed-release 81 mg tablet Your next dose is: Today, Tomorrow Other:  _________ Dose:  81 mg Take 81 mg by mouth daily. Refills:  0  
     
   
   
   
  
 atenolol 25 mg tablet Commonly known as:  TENORMIN Your next dose is: Today, Tomorrow Other:  _________ Dose:  1 Tab Take 1 Tab by mouth daily. Refills:  0 Cholecalciferol (Vitamin D3) 50,000 unit Cap Your next dose is: Today, Tomorrow Other:  _________ Dose:  1 Cap Take 1 Cap by mouth every seven (7) days. Refills:  0  
     
   
   
   
  
 fluticasone 50 mcg/actuation nasal spray Commonly known as:  Darlyn Reges Your next dose is: Today, Tomorrow Other:  _________ Dose:  2 Spray 2 Sprays by Both Nostrils route daily. Refills:  0  
     
   
   
   
  
 levothyroxine 50 mcg tablet Commonly known as:  SYNTHROID Your next dose is: Today, Tomorrow Other:  _________ Dose:  50 mcg Take 50 mcg by mouth Daily (before breakfast). Refills:  0  
     
   
   
   
  
 losartan 100 mg tablet Commonly known as:  COZAAR Your next dose is: Today, Tomorrow Other:  _________ Dose:  1 Tab Take 1 Tab by mouth daily. Refills:  0  
     
   
   
   
  
 omeprazole 20 mg capsule Commonly known as:  PRILOSEC Your next dose is: Today, Tomorrow Other:  _________ Dose:  20 mg Take 20 mg by mouth two (2) times a day. Refills:  0  
     
   
   
   
  
 pravastatin 20 mg tablet Commonly known as:  PRAVACHOL Your next dose is: Today, Tomorrow Other:  _________ Dose:  1 Tab Take 1 Tab by mouth daily. Refills:  0 VOLTAREN 1 % Gel Generic drug:  diclofenac Your next dose is: Today, Tomorrow Other:  _________ Dose:  2 g Apply 2 g to affected area two (2) times a day. Indications: OSTEOARTHRITIS OF THE KNEE, left knee Refills:  0 Where to Get Your Medications Information on where to get these meds will be given to you by the nurse or doctor. ! Ask your nurse or doctor about these medications  
  acetaminophen 500 mg tablet LORazepam 0.5 mg tablet  
 ondansetron 4 mg disintegrating tablet  
 senna 8.6 mg tablet  
 traMADol 50 mg tablet Discharge Instructions None Discharge Orders Procedure Order Date Status Priority Quantity Spec Type Associated Dx CALL YOUR DOCTOR For: Persistant nausea and vomiting., Temperature greater than 100.4., Difficulty breathing, headache, or visual disturbances. 01/19/17 1745 Normal Routine 1 Questions: For:  Persistant nausea and vomiting. For:  Temperature greater than 100.4. For:  Difficulty breathing, headache, or visual disturbances. ACTIVITY AFTER DISCHARGE Patient should: Resume activity as tolerated. 01/19/17 1745 Normal Routine 1 Schedule Instructions: With Rolling walker and as per Physical Therapy directives Questions: Patient should:  Resume activity as tolerated. DIET REGULAR 01/19/17 1745 Normal Routine 1 COMMODE CHAIR 01/19/17 1745 Normal Routine 1 SHOWER CHAIR 01/19/17 1745 Normal Routine 1 [employer] Announcement We are excited to announce that we are making your provider's discharge notes available to you in [employer]. You will see these notes when they are completed and signed by the physician that discharged you from your recent hospital stay. If you have any questions or concerns about any information you see in [employer], please call the Health Information Department where you were seen or reach out to your Primary Care Provider for more information about your plan of care. Introducing [facility]! Reena Levi introduces [employer] patient portal. Now you can access parts of your medical record, email your doctor's office, and request medication refills online. 1. In your internet browser, go to [email or web address] | [employer] 2. Click on the First Time User? Click Here link in the Sign In box. You will see the New Member Sign Up page. 3. Enter your [employer] Access Code exactly as it appears below. You will not need to use this code after youve completed the sign-up process. If you do not sign up before the expiration date, you must request a new code. · [employer] Access Code: [email or web address] Expires: 3/15/2017  4:07 PM 
 
4. Enter the last four digits of your Social Security Number (xxxx) and Date of Birth (mm/dd/yyyy) as indicated and click Submit. You will be taken to the next sign-up page. 5. Create a [employer] ID. This will be your [employer] login ID and cannot be changed, so think of one that is secure and easy to remember. 6. Create a [employer] password. You can change your password at any time. 7. Enter your Password Reset Question and Answer. This can be used at a later time if you forget your password. 8. Enter your e-mail address. You will receive e-mail notification when new information is available in [address] 9. Click Sign Up. You can now view and download portions of your medical record. 10. Click the Download Summary menu link to download a portable copy of your medical information. If you have questions, please visit the Frequently Asked Questions section of the [employer] website. Remember, MyChart is NOT to be used for urgent needs. For medical emergencies, dial 911. Now available from your iPhone and Android! General Information Please provide this summary of care documentation to your next provider. Patient Signature:  ____________________________________________________________ Date:  ____________________________________________________________  
  
Earnstine Flavio Provider Signature:  ____________________________________________________________ Date:  ____________________________________________________________

## 2017-01-15 NOTE — ED NOTES
Patient with episode of vomiting. Oral zofran administered. Unable to take scheduled medications at this time. Will re-attempt after letting zofran take effect.

## 2017-01-15 NOTE — ED PROVIDER NOTES
HPI Comments: 3:02 AM Pt is a 77 y.o. Female with a history of hypertension and hypercholesteremia who presents to the ED via EMS complaining of right knee pain and swelling after fall Friday night. She states she fell on the steps. She denies any LOC or head trauma during fall. No neck or back pain, no nausea or vomiting, no numbness or weakness since fall. She states she was in her usual state of health prior to fall. She denies any recent changes to her medications. She denies any upper extremity or LLE pain. No R ankle pain but she has \"maybe a little\" pain to her R hip. She states she has been unable to bear weight on her RLE since fall and she localized pain primarily to her R knee. No other acute symptoms or complaints were noted. Past Medical History:   Diagnosis Date    High cholesterol     Hypertension        History reviewed. No pertinent past surgical history. History reviewed. No pertinent family history. Social History     Social History    Marital status:      Spouse name: N/A    Number of children: N/A    Years of education: N/A     Occupational History    Not on file. Social History Main Topics    Smoking status: Not on file    Smokeless tobacco: Not on file    Alcohol use Not on file    Drug use: Not on file    Sexual activity: Not on file     Other Topics Concern    Not on file     Social History Narrative    No narrative on file         ALLERGIES: Review of patient's allergies indicates no known allergies. Review of Systems   Constitutional: Negative for chills, fatigue and fever. HENT: Negative for congestion. Eyes: Negative for pain. Respiratory: Negative for chest tightness and shortness of breath. Cardiovascular: Negative for chest pain and palpitations. Gastrointestinal: Negative for abdominal pain and vomiting. Endocrine: Negative. Genitourinary: Negative for dysuria and flank pain.    Musculoskeletal: Positive for arthralgias, gait problem and joint swelling. Negative for back pain, myalgias (RLE), neck pain and neck stiffness. Skin: Negative for pallor and wound. Allergic/Immunologic: Negative. Neurological: Negative for dizziness, syncope, weakness, light-headedness, numbness and headaches. Vitals:    01/15/17 0143   BP: 141/79   Pulse: 68   Resp: 18   Temp: 97.7 °F (36.5 °C)   SpO2: 99%   Weight: 54.4 kg (120 lb)            Physical Exam   Constitutional: She is oriented to person, place, and time. She appears well-developed and well-nourished. No distress. Resting comfortably on stretcher   HENT:   Head: Normocephalic and atraumatic. MM moist   Eyes: Conjunctivae and EOM are normal. No scleral icterus. Sclera clear bilaterally   Neck: Normal range of motion. Neck supple. No JVD present. No midline tenderness to palpation, full active ROM. Cardiovascular: Normal rate, regular rhythm and normal heart sounds. Exam reveals no gallop and no friction rub. No murmur heard. Pulmonary/Chest: Effort normal and breath sounds normal. No respiratory distress. She has no wheezes. She has no rales. She exhibits no tenderness. No crepitance with palpation   Abdominal: Soft. Bowel sounds are normal. She exhibits no distension. There is no tenderness. There is no rebound and no guarding. Genitourinary:   Genitourinary Comments: No CVA tenderness   Musculoskeletal: She exhibits tenderness. She exhibits no deformity. Normal inspection of upper extremities. No edema noted to bilateral lower extremities. No midline back tenderness to palpation. No tenderness with palpation along long bones, including clavicles with exception of mild tenderness with palpation over R hip and + TTP to R knee. All compartments are soft and easily compressible. No pain with ROM to bilateral ankles, wrists, elbows or shoulders. No gross deformities appreciated. Lymphadenopathy:     She has no cervical adenopathy. Neurological: She is alert and oriented to person, place, and time. No cranial nerve deficit. She exhibits normal muscle tone. Normal motor and sensation bilaterally to upper and lower extremities   Skin: Skin is warm and dry. She is not diaphoretic. Psychiatric:   Normal mood and affect. Vitals reviewed. MDM  Number of Diagnoses or Management Options  Diagnosis management comments: Pt with R knee pain since fall on Friday. Edema and tenderness noted to R knee with mild tenderness to R hip. Will check XR, treat pain and reassess. No acute fracture appreciated on CT but pt is unable to bear weight. Discussed with Dr. Rai Simth, will admit as observation and have PT evaluate pt. Pt in agreement with admission plans. Amount and/or Complexity of Data Reviewed  Clinical lab tests: ordered and reviewed  Tests in the radiology section of CPT®: ordered and reviewed  Decide to obtain previous medical records or to obtain history from someone other than the patient: yes  Obtain history from someone other than the patient: yes  Discuss the patient with other providers: yes  Independent visualization of images, tracings, or specimens: yes    Risk of Complications, Morbidity, and/or Mortality  Presenting problems: moderate  Management options: moderate    Patient Progress  Patient progress: stable    ED Course       Procedures    Scribe [address] for and in the presence of Matthew Gonsalves MD  01/15/17. Signed by: Geo Blue 01/15/17, 3:13 AM    Physician Attestation  I personally performed the services described in the documentation, reviewed the documentation, as recorded by the scribe in my presence, and it accurately and completely records my words and actions.     Matthew Gonsalves MD 01/15/17

## 2017-01-15 NOTE — CONSULTS
Orthopedic Admission History and Physical          Patient: Uzair Manzo  MRN: [id number]  SSN: [id number]   YOB: 1940  Age: 77 y.o. Sex: female       DOA: [unfilled]  LOS:  LOS: 0 days           Subjective:     Uzair Manzo is a 77 y.o.  female who presents to the ER today c/o right knee pain after a fall on 1/13/17. She denied SOB, chest pain, or dizzyness as a cause of her fall. She c/o right knee pain and swelling. She was seen by Dr Patty Laguerre this Kiko Bonnet  denied SOB, chest pain, light headedness, dizziness. Her ED presentation is as below:    HPI Comments: 3:02 AM Pt is a 77 y.o. Female with a history of hypertension and hypercholesteremia who presents to the ED via EMS complaining of right knee pain and swelling after fall Friday night. She states she fell on the steps. She denies any LOC or head trauma during fall. No neck or back pain, no nausea or vomiting, no numbness or weakness since fall. She states she was in her usual state of health prior to fall. She denies any recent changes to her medications. She denies any upper extremity or LLE pain. No R ankle pain but she has \"maybe a little\" pain to her R hip. She states she has been unable to bear weight on her RLE since fall and she localized pain primarily to her R knee. No other acute symptoms or complaints were noted. Patient Active Problem List    Diagnosis Date Noted    Impaired gait and mobility 01/15/2017    Effusion of knee joint right 01/15/2017    Acute pain of right knee 01/15/2017    Essential hypertension 01/15/2017    GERD (gastroesophageal reflux disease)     Hypothyroid      Past Medical History   Diagnosis Date    GERD (gastroesophageal reflux disease)     High cholesterol     Hypertension     Hypothyroid       History reviewed. No pertinent past surgical history. Prior to Admission medications    Medication Sig Start Date End Date Taking?  Authorizing Provider   amLODIPine (NORVASC) 10 mg tablet Take 1 Tab by mouth daily. 12/5/16  Yes Historical Provider   atenolol (TENORMIN) 25 mg tablet Take 1 Tab by mouth daily. 11/1/16  Yes Historical Provider   losartan (COZAAR) 100 mg tablet Take 1 Tab by mouth daily. 10/13/16  Yes Historical Provider   pravastatin (PRAVACHOL) 20 mg tablet Take 1 Tab by mouth daily. 10/25/16  Yes Historical Provider   aspirin delayed-release 81 mg tablet Take 81 mg by mouth daily. Yes Historical Provider   levothyroxine (SYNTHROID) 50 mcg tablet Take 50 mcg by mouth Daily (before breakfast). Yes Historical Provider   omeprazole (PRILOSEC) 20 mg capsule Take 20 mg by mouth two (2) times a day. Yes Historical Provider   fluticasone (FLONASE) 50 mcg/actuation nasal spray 2 Sprays by Both Nostrils route daily. 11/1/16   Historical Provider   alendronate (FOSAMAX) 70 mg tablet Take 70 mg by mouth every seven (7) days. Historical Provider   acetaminophen (TYLENOL EXTRA STRENGTH) 500 mg tablet Take 500 mg by mouth every six (6) hours as needed for Pain. Historical Provider   Cholecalciferol, Vitamin D3, 50,000 unit cap Take 1 Cap by mouth every seven (7) days. Historical Provider   diclofenac (VOLTAREN) 1 % gel Apply 2 g to affected area two (2) times a day.  Indications: OSTEOARTHRITIS OF THE KNEE, left knee    Historical Provider     Current Facility-Administered Medications   Medication Dose Route Frequency    acetaminophen (TYLENOL) tablet 1,000 mg  1,000 mg Oral TID PRN    amLODIPine (NORVASC) tablet 10 mg  10 mg Oral DAILY    aspirin delayed-release tablet 81 mg  81 mg Oral DAILY    atenolol (TENORMIN) tablet 25 mg  25 mg Oral DAILY    levothyroxine (SYNTHROID) tablet 50 mcg  50 mcg Oral ACB    losartan (COZAAR) tablet 100 mg  100 mg Oral DAILY    pantoprazole (PROTONIX) tablet 40 mg  40 mg Oral QHS    pravastatin (PRAVACHOL) tablet 20 mg  20 mg Oral DAILY    sodium chloride (NS) flush 5-10 mL  5-10 mL IntraVENous Q8H    sodium chloride (NS) flush 5-10 mL  5-10 mL IntraVENous PRN    traMADol (ULTRAM) tablet 50 mg  50 mg Oral Q6H PRN    morphine injection 2 mg  2 mg IntraVENous Q3H PRN    naloxone (NARCAN) injection 0.4 mg  0.4 mg IntraVENous PRN    diphenhydrAMINE (BENADRYL) capsule 25 mg  25 mg Oral Q4H PRN    ondansetron (ZOFRAN ODT) tablet 4 mg  4 mg Oral Q8H PRN    senna (SENOKOT) tablet 17.2 mg  2 Tab Oral QHS    LORazepam (ATIVAN) tablet 0.5 mg  0.5 mg Oral Q6H PRN    heparin (porcine) injection 5,000 Units  5,000 Units SubCUTAneous Q8H     Current Outpatient Prescriptions   Medication Sig    amLODIPine (NORVASC) 10 mg tablet Take 1 Tab by mouth daily.  atenolol (TENORMIN) 25 mg tablet Take 1 Tab by mouth daily.  losartan (COZAAR) 100 mg tablet Take 1 Tab by mouth daily.  pravastatin (PRAVACHOL) 20 mg tablet Take 1 Tab by mouth daily.  aspirin delayed-release 81 mg tablet Take 81 mg by mouth daily.  levothyroxine (SYNTHROID) 50 mcg tablet Take 50 mcg by mouth Daily (before breakfast).  omeprazole (PRILOSEC) 20 mg capsule Take 20 mg by mouth two (2) times a day.  fluticasone (FLONASE) 50 mcg/actuation nasal spray 2 Sprays by Both Nostrils route daily.  alendronate (FOSAMAX) 70 mg tablet Take 70 mg by mouth every seven (7) days.  acetaminophen (TYLENOL EXTRA STRENGTH) 500 mg tablet Take 500 mg by mouth every six (6) hours as needed for Pain.  Cholecalciferol, Vitamin D3, 50,000 unit cap Take 1 Cap by mouth every seven (7) days.  diclofenac (VOLTAREN) 1 % gel Apply 2 g to affected area two (2) times a day. Indications: OSTEOARTHRITIS OF THE KNEE, left knee      No Known Allergies   Social History   Substance Use Topics    Smoking status: Never Smoker    Smokeless tobacco: Not on file    Alcohol use No      History reviewed. No pertinent family history. Review of Systems  A comprehensive review of systems was negative except for that written in the HPI.     Review of Systems:  Constitutional: negative for fevers, chills and sweats  Eyes: negative for glaucoma, visual disturbance and irritation  Ears, Nose, Mouth, Throat, and Face: negative for earaches and nasal congestion  Respiratory: negative for cough, sputum, asthma or wheezing  Cardiovascular: negative for dyspnea, palpitations, irregular heart beats  Gastrointestinal: negative for reflux symptoms, nausea and vomiting  Hematologic/Lymphatic: negative for easy bruising  Musculoskeletal:positive for right knee swelling/pain  Neurological: negative for headaches, dizziness, memory problems and tremor      Objective:     Patient Vitals for the past 8 hrs:   BP Temp Pulse Resp SpO2 Weight   01/15/17 0726 100/65 97 °F (36.1 °C) 73 16 97 % -   01/15/17 0143 141/79 97.7 °F (36.5 °C) 68 18 99 % 120 lb (54.4 kg)     Vitals:    01/15/17 0143 01/15/17 0726   BP: 141/79 100/65   Pulse: 68 73   Resp: 18 16   Temp: 97.7 °F (36.5 °C) 97 °F (36.1 °C)   SpO2: 99% 97%   Weight: 120 lb (54.4 kg)       Temp (24hrs), Av.4 °F (36.3 °C), Min:97 °F (36.1 °C), Max:97.7 °F (36.5 °C)      Mental Status: no dementia  Physical Examination   GENERAL: alert, cooperative, no distress, appears stated age  EYE: negative findings: anicteric sclera  LUNG: clear to auscultation bilaterally  HEART: S1, S2 normal  ABDOMEN: soft, non-tender. Bowel sounds normal. No masses,  no organomegaly  EXTREMITIES:     Knees:  RIGHT        Cutaneous: Skin intact, no abrasions, blisters, wounds, erythema       Effusion: Is present       Crepitus:  mild PF joint crepitus       Tenderness: Tenderness:Medial joint line, Lateral joint line, Patella, Patellar tendon and Medial retinaculum        Alignment of Knee: neutral when in bed       ROM: diminished range with pain. Intact Extensor mechanism.        Fullness or swelling: None to popliteal fossa region       Stability: Not tested or conducted due to tenderness         Homans sign is negative, no sign of DVT       DIAGNOSTIC IMAGING [facility]: INTERPRETATION BY RADIOLOGIST     XR Results (most recent):    Results from Hospital Encounter encounter on 01/15/17   XR KNEE RT 3 V   Narrative Description:  Right knee, 3 view    Clinical Indication:  Pain after falling down the stairs    Comparison: None. Findings:  Cortical contour appears interrupted along the lateral aspect of the  tibial plateau near the tibiofibular syndesmosis on the oblique view. Moderate  tricompartmental periarticular spurring. Chondrocalcinosis is present. There is  a large, dense suprapatellar joint effusion. Impression Impression:      Large, dense suprapatellar joint effusion with possible fracture along the  lateral tibial plateau. CT could confirm. Tricompartmental osteoarthrosis. Chondrocalcinosis. CT Results (most recent):    Results from Hospital Encounter encounter on 01/15/17   CT KNEE RT WO CONT   Narrative Description:  CT right knee without contrast    TECHNIQUE: Helically acquired axial CT imaging of the right knee without IV  contrast was performed. Coronal and sagittal reformations generated and  reviewed. All CT scans at this facility are performed using dose optimization technique as  appropriate to a performed exam, to include automated exposure control,  adjustment of the mA and/or kV according to patient size (including appropriate  matching for site-specific examinations), or use of iterative reconstruction  technique. Clinical Indication:  Pain after fall. Same-day radiographs showed a large  suprapatellar joint effusion and possible tibial plateau fracture. Comparison: Correlation with radiograph, January 15, 2017. Findings: Moderate tricompartmental osteoarthrosis evidenced by periarticular spurring,  subcortical cyst formation in moderate eburnation of the medial compartment.  The  appearance of a fracture on same-day radiographs appears to represent an unusual  morphology of the posterior lateral tibial plateau with superimposed  periarticular spurring. No fracture is seen. There is a large suprapatellar  joint effusion. Chondrocalcinosis is present. The visualized soft tissues show mild atherosclerosis. No perimuscular or  intramuscular edema. No skin thickening or subcutaneous edema. Internal  derangement is not excludable on the basis of this study. Impression Impression:      Large suprapatellar joint effusion of the right knee. No evidence of fracture. Internal derangement not excluded with this technique. Multicompartmental osteoarthrosis. Chondrocalcinosis. Labs:   CMP:   Lab Results   Component Value Date/Time     01/15/2017 01:55 AM    K 3.9 01/15/2017 01:55 AM     (H) 01/15/2017 01:55 AM    CO2 21 01/15/2017 01:55 AM    AGAP 9 01/15/2017 01:55 AM     (H) 01/15/2017 01:55 AM    BUN 16 01/15/2017 01:55 AM    CREA 0.66 01/15/2017 01:55 AM    GFRAA >60 01/15/2017 01:55 AM    GFRNA >60 01/15/2017 01:55 AM    CA 9.5 01/15/2017 01:55 AM    ALB 4.0 01/15/2017 01:55 AM    TP 7.5 01/15/2017 01:55 AM    GLOB 3.5 01/15/2017 01:55 AM    AGRAT 1.1 01/15/2017 01:55 AM    SGOT 25 01/15/2017 01:55 AM    ALT 38 01/15/2017 01:55 AM     CBC:   Lab Results   Component Value Date/Time    WBC 9.6 01/15/2017 01:55 AM    HGB 12.5 01/15/2017 01:55 AM    HCT 36.9 01/15/2017 01:55 AM     01/15/2017 01:55 AM     COAGS:   Lab Results   Component Value Date/Time    PTP 12.8 01/15/2017 01:55 AM    INR 1.0 01/15/2017 01:55 AM          Assessment:     Patient: Gaby Landon is a 77 y.o.   female who in my professional opinion has a :    Ortho Dx: RIGHT KNEE EFFUSION AFTER A FALL        RIGHT KNEE OA (TRICOMPARTMENTAL)    Condition: stable       Patient Active Problem List    Diagnosis Date Noted    Impaired gait and mobility 01/15/2017    Effusion of knee joint right 01/15/2017    Acute pain of right knee 01/15/2017    Essential hypertension 01/15/2017    GERD (gastroesophageal reflux disease)     Hypothyroid          Plan:     1. KNEE BRACE TO BE ORDERED:   2. PT : WBAT WITH WALKER AND SUPERVISION  3. [facility] PLANNING      MADELIN Ann MD  FREDDIE  9:38 AM

## 2017-01-16 PROBLEM — M17.11 PRIMARY OSTEOARTHRITIS OF RIGHT KNEE: Status: ACTIVE | Noted: 2017-01-16

## 2017-01-16 PROBLEM — S86.919A KNEE STRAIN: Status: ACTIVE | Noted: 2017-01-16

## 2017-01-16 LAB
ATRIAL RATE: 62 BPM
BASOPHILS # BLD AUTO: 0 K/UL (ref 0–0.1)
BASOPHILS # BLD: 0 % (ref 0–2)
CALCULATED P AXIS, ECG09: 2 DEGREES
CALCULATED R AXIS, ECG10: -5 DEGREES
CALCULATED T AXIS, ECG11: 17 DEGREES
CK MB CFR SERPL CALC: 1.2 % (ref 0–4)
CK MB SERPL-MCNC: 0.7 NG/ML (ref 0.5–3.6)
CK SERPL-CCNC: 59 U/L (ref 26–192)
DIAGNOSIS, 93000: NORMAL
DIFFERENTIAL METHOD BLD: ABNORMAL
EOSINOPHIL # BLD: 0.2 K/UL (ref 0–0.4)
EOSINOPHIL NFR BLD: 3 % (ref 0–5)
ERYTHROCYTE [DISTWIDTH] IN BLOOD BY AUTOMATED COUNT: 15.7 % (ref 11.6–14.5)
GLUCOSE BLD STRIP.AUTO-MCNC: 121 MG/DL (ref 70–110)
HCT VFR BLD AUTO: 34.2 % (ref 35–45)
HGB BLD-MCNC: 11 G/DL (ref 12–16)
LYMPHOCYTES # BLD AUTO: 31 % (ref 21–52)
LYMPHOCYTES # BLD: 2.1 K/UL (ref 0.9–3.6)
MCH RBC QN AUTO: 26.2 PG (ref 24–34)
MCHC RBC AUTO-ENTMCNC: 32.2 G/DL (ref 31–37)
MCV RBC AUTO: 81.4 FL (ref 74–97)
MONOCYTES # BLD: 0.7 K/UL (ref 0.05–1.2)
MONOCYTES NFR BLD AUTO: 11 % (ref 3–10)
NEUTS SEG # BLD: 3.7 K/UL (ref 1.8–8)
NEUTS SEG NFR BLD AUTO: 55 % (ref 40–73)
P-R INTERVAL, ECG05: 148 MS
PLATELET # BLD AUTO: 251 K/UL (ref 135–420)
PMV BLD AUTO: 10.5 FL (ref 9.2–11.8)
Q-T INTERVAL, ECG07: 396 MS
QRS DURATION, ECG06: 94 MS
QTC CALCULATION (BEZET), ECG08: 401 MS
RBC # BLD AUTO: 4.2 M/UL (ref 4.2–5.3)
TROPONIN I SERPL-MCNC: <0.02 NG/ML (ref 0–0.04)
VENTRICULAR RATE, ECG03: 62 BPM
WBC # BLD AUTO: 6.7 K/UL (ref 4.6–13.2)

## 2017-01-16 PROCEDURE — 96361 HYDRATE IV INFUSION ADD-ON: CPT

## 2017-01-16 PROCEDURE — 74011000258 HC RX REV CODE- 258: Performed by: HOSPITALIST

## 2017-01-16 PROCEDURE — 74011250637 HC RX REV CODE- 250/637: Performed by: HOSPITALIST

## 2017-01-16 PROCEDURE — 85025 COMPLETE CBC W/AUTO DIFF WBC: CPT | Performed by: HOSPITALIST

## 2017-01-16 PROCEDURE — 99218 HC RM OBSERVATION: CPT

## 2017-01-16 PROCEDURE — 82962 GLUCOSE BLOOD TEST: CPT

## 2017-01-16 PROCEDURE — 96372 THER/PROPH/DIAG INJ SC/IM: CPT

## 2017-01-16 PROCEDURE — 36415 COLL VENOUS BLD VENIPUNCTURE: CPT | Performed by: HOSPITALIST

## 2017-01-16 PROCEDURE — 96376 TX/PRO/DX INJ SAME DRUG ADON: CPT

## 2017-01-16 PROCEDURE — 82550 ASSAY OF CK (CPK): CPT | Performed by: HOSPITALIST

## 2017-01-16 PROCEDURE — 74011250636 HC RX REV CODE- 250/636: Performed by: HOSPITALIST

## 2017-01-16 RX ADMIN — PRAVASTATIN SODIUM 20 MG: 20 TABLET ORAL at 09:01

## 2017-01-16 RX ADMIN — ASPIRIN 81 MG: 81 TABLET, COATED ORAL at 09:01

## 2017-01-16 RX ADMIN — DEXTROSE MONOHYDRATE AND SODIUM CHLORIDE 125 ML/HR: 5; .9 INJECTION, SOLUTION INTRAVENOUS at 04:49

## 2017-01-16 RX ADMIN — HEPARIN SODIUM 5000 UNITS: 5000 INJECTION, SOLUTION INTRAVENOUS; SUBCUTANEOUS at 09:02

## 2017-01-16 RX ADMIN — Medication 2 MG: at 21:15

## 2017-01-16 RX ADMIN — LEVOTHYROXINE SODIUM 50 MCG: 50 TABLET ORAL at 07:23

## 2017-01-16 RX ADMIN — ATENOLOL 25 MG: 50 TABLET ORAL at 09:01

## 2017-01-16 RX ADMIN — AMLODIPINE BESYLATE 10 MG: 10 TABLET ORAL at 10:04

## 2017-01-16 RX ADMIN — TRAMADOL HYDROCHLORIDE 50 MG: 50 TABLET, FILM COATED ORAL at 15:50

## 2017-01-16 RX ADMIN — Medication 10 ML: at 13:12

## 2017-01-16 RX ADMIN — HEPARIN SODIUM 5000 UNITS: 5000 INJECTION, SOLUTION INTRAVENOUS; SUBCUTANEOUS at 15:50

## 2017-01-16 RX ADMIN — Medication 10 ML: at 22:24

## 2017-01-16 RX ADMIN — DEXTROSE MONOHYDRATE AND SODIUM CHLORIDE 125 ML/HR: 5; .9 INJECTION, SOLUTION INTRAVENOUS at 12:57

## 2017-01-16 RX ADMIN — DEXTROSE MONOHYDRATE AND SODIUM CHLORIDE 125 ML/HR: 5; .9 INJECTION, SOLUTION INTRAVENOUS at 21:15

## 2017-01-16 RX ADMIN — LOSARTAN POTASSIUM 100 MG: 25 TABLET, FILM COATED ORAL at 10:04

## 2017-01-16 RX ADMIN — PANTOPRAZOLE SODIUM 40 MG: 40 TABLET, DELAYED RELEASE ORAL at 21:17

## 2017-01-16 RX ADMIN — ACETAMINOPHEN 1000 MG: 500 TABLET, COATED ORAL at 03:31

## 2017-01-16 RX ADMIN — SENNOSIDES 17.2 MG: 8.6 TABLET, FILM COATED ORAL at 21:17

## 2017-01-16 NOTE — PROGRESS NOTES
Hospitalist Progress Note    Patient: Alicja Delgadillo Age: 77 y.o. : 1940 MR#: [id number] SSN: [id number]  Date/Time: 2017 2:07 PM    Subjective:     Patient resting in bed with right knee brace on  Denies acute distress  Awaiting therapy evaluations for disposition      Review of Systems - General ROS: negative for - chills, fever or sleep disturbance       Objective:   VS:   Visit Vitals    /70    Pulse (!) 56    Temp 97 °F (36.1 °C)    Resp 16    Wt 54.4 kg (120 lb)    SpO2 98%    Breastfeeding No      Tmax/24hrs: Temp (24hrs), Av.5 °F (36.4 °C), Min:96.8 °F (36 °C), Max:98.6 °F (37 °C)     Intake/Output Summary (Last 24 hours) at 17 1407  Last data filed at 17 1257   Gross per 24 hour   Intake          2045.83 ml   Output                0 ml   Net          2045.83 ml       General: alert and oriented ×3  HEENT:speech clear, no scleral icterus, conjunctiva clear, no focal deficits detected, negative JVD  Cardiovascular:heart rate regular    Pulmonary: lungs clear to bases   GI:  Soft, non tender, bowel sounds positive  Extremities: hinged knee brace on RLE, nontender, pulses positive     Labs:    Recent Results (from the past 24 hour(s))   CARDIAC PANEL,(CK, CKMB & TROPONIN)    Collection Time: 01/15/17  7:55 PM   Result Value Ref Range    CK 36 26 - 192 U/L    CK - MB <0.5 (L) 0.5 - 3.6 ng/ml    CK-MB Index CANNOT BE CALCULATED 0.0 - 4.0 %    Troponin-I, Qt. <0.02 0.0 - 0.045 NG/ML   HEPATIC FUNCTION PANEL    Collection Time: 01/15/17  7:55 PM   Result Value Ref Range    Protein, total 7.9 6.4 - 8.2 g/dL    Albumin 3.5 3.4 - 5.0 g/dL    Globulin 4.4 (H) 2.0 - 4.0 g/dL    A-G Ratio 0.8 0.8 - 1.7      Bilirubin, total 0.6 0.2 - 1.0 MG/DL    Bilirubin, direct 0.2 0.0 - 0.2 MG/DL    Alk.  phosphatase 118 (H) 45 - 117 U/L    AST 58 (H) 15 - 37 U/L    ALT 71 (H) 13 - 56 U/L   AMYLASE    Collection Time: 01/15/17  7:55 PM   Result Value Ref Range    Amylase 23 (L) 25 - 115 U/L LIPASE    Collection Time: 01/15/17  7:55 PM   Result Value Ref Range    Lipase 90 73 - 797 U/L   METABOLIC PANEL, BASIC    Collection Time: 01/15/17  7:55 PM   Result Value Ref Range    Sodium 139 136 - 145 mmol/L    Potassium 3.9 3.5 - 5.5 mmol/L    Chloride 107 100 - 108 mmol/L    CO2 23 21 - 32 mmol/L    Anion gap 9 3.0 - 18 mmol/L    Glucose 136 (H) 74 - 99 mg/dL    BUN 15 7.0 - 18 MG/DL    Creatinine 0.68 0.6 - 1.3 MG/DL    BUN/Creatinine ratio 22 (H) 12 - 20      GFR est AA >60 >60 ml/min/1.73m2    GFR est non-AA >60 >60 ml/min/1.73m2    Calcium 9.6 8.5 - 10.1 MG/DL   EKG, 12 LEAD, INITIAL    Collection Time: 01/15/17  8:00 PM   Result Value Ref Range    Ventricular Rate 62 BPM    Atrial Rate 62 BPM    P-R Interval 148 ms    QRS Duration 94 ms    Q-T Interval 396 ms    QTC Calculation (Bezet) 401 ms    Calculated P Axis 2 degrees    Calculated R Axis -5 degrees    Calculated T Axis 17 degrees    Diagnosis       Normal sinus rhythm  Nonspecific T wave abnormality  Abnormal ECG  No previous ECGs available     URINALYSIS W/ RFLX MICROSCOPIC    Collection Time: 01/15/17 11:30 PM   Result Value Ref Range    Color YELLOW      Appearance CLOUDY      Specific gravity 1.022 1.005 - 1.030      pH (UA) 5.5 5.0 - 8.0      Protein TRACE (A) NEG mg/dL    Glucose NEGATIVE  NEG mg/dL    Ketone NEGATIVE  NEG mg/dL    Bilirubin NEGATIVE  NEG      Blood SMALL (A) NEG      Urobilinogen 1.0 0.2 - 1.0 EU/dL    Nitrites POSITIVE (A) NEG      Leukocyte Esterase MODERATE (A) NEG     URINE MICROSCOPIC ONLY    Collection Time: 01/15/17 11:30 PM   Result Value Ref Range    WBC 21 to 35 0 - 4 /hpf    RBC 0 to 3 0 - 5 /hpf    Epithelial cells 3+ 0 - 5 /lpf    Bacteria 4+ (A) NEG /hpf    Mucus 2+ (A) NEG /lpf   CBC WITH AUTOMATED DIFF    Collection Time: 01/16/17 11:33 AM   Result Value Ref Range    WBC 6.7 4.6 - 13.2 K/uL    RBC 4.20 4. 20 - 5.30 M/uL    HGB 11.0 (L) 12.0 - 16.0 g/dL    HCT 34.2 (L) 35.0 - 45.0 %    MCV 81.4 74.0 - 97.0 FL    MCH 26.2 24.0 - 34.0 PG    MCHC 32.2 31.0 - 37.0 g/dL    RDW 15.7 (H) 11.6 - 14.5 %    PLATELET 816 826 - 417 K/uL    MPV 10.5 9.2 - 11.8 FL    NEUTROPHILS 55 40 - 73 %    LYMPHOCYTES 31 21 - 52 %    MONOCYTES 11 (H) 3 - 10 %    EOSINOPHILS 3 0 - 5 %    BASOPHILS 0 0 - 2 %    ABS. NEUTROPHILS 3.7 1.8 - 8.0 K/UL    ABS. LYMPHOCYTES 2.1 0.9 - 3.6 K/UL    ABS. MONOCYTES 0.7 0.05 - 1.2 K/UL    ABS. EOSINOPHILS 0.2 0.0 - 0.4 K/UL    ABS. BASOPHILS 0.0 0.0 - 0.1 K/UL    DF AUTOMATED     CARDIAC PANEL,(CK, CKMB & TROPONIN)    Collection Time: 01/16/17 11:33 AM   Result Value Ref Range    CK 59 26 - 192 U/L    CK - MB 0.7 0.5 - 3.6 ng/ml    CK-MB Index 1.2 0.0 - 4.0 %    Troponin-I, Qt. <0.02 0.0 - 0.045 NG/ML       Assessment/Plan:   Contusion right knee with effusion  Hinged right knee brace on  Orthopedic follow-up appreciated  Stat PT eval not done, pending  Disposition DME-pending PT eval  Notified CM and Therapist  Pain controlled  Therapy evaluations were placed 1/15/17,  PT re-ordered stat this am    Episode of vomiting last evening  No vomiting today  Symptoms have resolved    PT/OT-walker supervision per ortho: still pending    Additional Notes:      Full Code    Disposition:   review of systems and exam done with daughter on phone, with patient's permission  Patient's daughter, [phone]    Home with home health     Ms. Rosario is medically stable for discharge today,  Awaiting physical therapy evaluation for walker supervision.     Case discussed with:  [x]Patient  []Family  [x]Nursing  []Case Management  DVT Prophylaxis:  []Lovenox  [x]Hep SQ  []SCDs  []Coumadin   []On Heparin gtt    Signed By: Consuelo Bonner DO     January 16, 2017 2:07 PM

## 2017-01-16 NOTE — ED NOTES
Security to patient's bedside. Money with wallet and credit card sent to safe. Patient kept yellow copy.

## 2017-01-16 NOTE — ED NOTES
Patient assisted to bathroom, urine specimen collected and sent to lab, no further nausea per patient. Awake and quiet.

## 2017-01-16 NOTE — ED NOTES
Assumed care of patient c/o nausea , no s/s of acute distress, no emesis @ present VSS, continue to monitor.

## 2017-01-16 NOTE — ROUTINE PROCESS
Patient AOX4, denies pain, SOB or any distress at this  Time. pedal pulses palpable, cap refill < 3 sec, sensation present, denies tingling, no edema noted. Braises on the right leg is intact. Pt left resting in bed in low position. No sign and symptoms of distress. Call bell in reach.

## 2017-01-17 LAB
ALBUMIN SERPL BCP-MCNC: 2.8 G/DL (ref 3.4–5)
ALBUMIN/GLOB SERPL: 0.8 {RATIO} (ref 0.8–1.7)
ALP SERPL-CCNC: 129 U/L (ref 45–117)
ALT SERPL-CCNC: 69 U/L (ref 13–56)
ANION GAP BLD CALC-SCNC: 8 MMOL/L (ref 3–18)
AST SERPL W P-5'-P-CCNC: 55 U/L (ref 15–37)
BASOPHILS # BLD AUTO: 0 K/UL (ref 0–0.06)
BASOPHILS # BLD: 0 % (ref 0–2)
BILIRUB SERPL-MCNC: 0.2 MG/DL (ref 0.2–1)
BUN SERPL-MCNC: 9 MG/DL (ref 7–18)
BUN/CREAT SERPL: 14 (ref 12–20)
CALCIUM SERPL-MCNC: 8.9 MG/DL (ref 8.5–10.1)
CHLORIDE SERPL-SCNC: 108 MMOL/L (ref 100–108)
CO2 SERPL-SCNC: 22 MMOL/L (ref 21–32)
CREAT SERPL-MCNC: 0.63 MG/DL (ref 0.6–1.3)
DIFFERENTIAL METHOD BLD: ABNORMAL
EOSINOPHIL # BLD: 0.2 K/UL (ref 0–0.4)
EOSINOPHIL NFR BLD: 2 % (ref 0–5)
ERYTHROCYTE [DISTWIDTH] IN BLOOD BY AUTOMATED COUNT: 15.4 % (ref 11.6–14.5)
GLOBULIN SER CALC-MCNC: 3.7 G/DL (ref 2–4)
GLUCOSE SERPL-MCNC: 133 MG/DL (ref 74–99)
HCT VFR BLD AUTO: 32.9 % (ref 35–45)
HGB BLD-MCNC: 10.7 G/DL (ref 12–16)
LYMPHOCYTES # BLD AUTO: 17 % (ref 21–52)
LYMPHOCYTES # BLD: 1.5 K/UL (ref 0.9–3.6)
MCH RBC QN AUTO: 26.5 PG (ref 24–34)
MCHC RBC AUTO-ENTMCNC: 32.5 G/DL (ref 31–37)
MCV RBC AUTO: 81.4 FL (ref 74–97)
MONOCYTES # BLD: 1 K/UL (ref 0.05–1.2)
MONOCYTES NFR BLD AUTO: 11 % (ref 3–10)
NEUTS SEG # BLD: 6.3 K/UL (ref 1.8–8)
NEUTS SEG NFR BLD AUTO: 70 % (ref 40–73)
PLATELET # BLD AUTO: 236 K/UL (ref 135–420)
PMV BLD AUTO: 10.1 FL (ref 9.2–11.8)
POTASSIUM SERPL-SCNC: 3.9 MMOL/L (ref 3.5–5.5)
PROT SERPL-MCNC: 6.5 G/DL (ref 6.4–8.2)
RBC # BLD AUTO: 4.04 M/UL (ref 4.2–5.3)
SODIUM SERPL-SCNC: 138 MMOL/L (ref 136–145)
WBC # BLD AUTO: 9 K/UL (ref 4.6–13.2)

## 2017-01-17 PROCEDURE — 80053 COMPREHEN METABOLIC PANEL: CPT | Performed by: HOSPITALIST

## 2017-01-17 PROCEDURE — 97165 OT EVAL LOW COMPLEX 30 MIN: CPT

## 2017-01-17 PROCEDURE — G8979 MOBILITY GOAL STATUS: HCPCS

## 2017-01-17 PROCEDURE — 96372 THER/PROPH/DIAG INJ SC/IM: CPT

## 2017-01-17 PROCEDURE — 96376 TX/PRO/DX INJ SAME DRUG ADON: CPT

## 2017-01-17 PROCEDURE — 97162 PT EVAL MOD COMPLEX 30 MIN: CPT

## 2017-01-17 PROCEDURE — G8978 MOBILITY CURRENT STATUS: HCPCS

## 2017-01-17 PROCEDURE — 74011250636 HC RX REV CODE- 250/636: Performed by: HOSPITALIST

## 2017-01-17 PROCEDURE — 36415 COLL VENOUS BLD VENIPUNCTURE: CPT | Performed by: HOSPITALIST

## 2017-01-17 PROCEDURE — 74011000258 HC RX REV CODE- 258: Performed by: HOSPITALIST

## 2017-01-17 PROCEDURE — 74011250637 HC RX REV CODE- 250/637: Performed by: HOSPITALIST

## 2017-01-17 PROCEDURE — 99218 HC RM OBSERVATION: CPT

## 2017-01-17 PROCEDURE — 85025 COMPLETE CBC W/AUTO DIFF WBC: CPT | Performed by: HOSPITALIST

## 2017-01-17 RX ADMIN — Medication 10 ML: at 05:28

## 2017-01-17 RX ADMIN — PRAVASTATIN SODIUM 20 MG: 20 TABLET ORAL at 09:00

## 2017-01-17 RX ADMIN — AMLODIPINE BESYLATE 10 MG: 10 TABLET ORAL at 09:31

## 2017-01-17 RX ADMIN — HEPARIN SODIUM 5000 UNITS: 5000 INJECTION, SOLUTION INTRAVENOUS; SUBCUTANEOUS at 04:17

## 2017-01-17 RX ADMIN — Medication 2 MG: at 09:30

## 2017-01-17 RX ADMIN — HEPARIN SODIUM 5000 UNITS: 5000 INJECTION, SOLUTION INTRAVENOUS; SUBCUTANEOUS at 23:50

## 2017-01-17 RX ADMIN — LOSARTAN POTASSIUM 100 MG: 25 TABLET, FILM COATED ORAL at 09:31

## 2017-01-17 RX ADMIN — Medication 10 ML: at 22:00

## 2017-01-17 RX ADMIN — DEXTROSE MONOHYDRATE AND SODIUM CHLORIDE 125 ML/HR: 5; .9 INJECTION, SOLUTION INTRAVENOUS at 21:03

## 2017-01-17 RX ADMIN — PANTOPRAZOLE SODIUM 40 MG: 40 TABLET, DELAYED RELEASE ORAL at 21:03

## 2017-01-17 RX ADMIN — Medication 2 MG: at 04:21

## 2017-01-17 RX ADMIN — ATENOLOL 25 MG: 50 TABLET ORAL at 09:31

## 2017-01-17 RX ADMIN — Medication 2 MG: at 15:05

## 2017-01-17 RX ADMIN — ASPIRIN 81 MG: 81 TABLET, COATED ORAL at 09:31

## 2017-01-17 RX ADMIN — HEPARIN SODIUM 5000 UNITS: 5000 INJECTION, SOLUTION INTRAVENOUS; SUBCUTANEOUS at 15:06

## 2017-01-17 RX ADMIN — DEXTROSE MONOHYDRATE AND SODIUM CHLORIDE 125 ML/HR: 5; .9 INJECTION, SOLUTION INTRAVENOUS at 05:24

## 2017-01-17 RX ADMIN — SENNOSIDES 17.2 MG: 8.6 TABLET, FILM COATED ORAL at 21:03

## 2017-01-17 RX ADMIN — LEVOTHYROXINE SODIUM 50 MCG: 50 TABLET ORAL at 09:32

## 2017-01-17 NOTE — PROGRESS NOTES
01/16/17 8180   Patient Observation   Observations pt ambulate to use the bathroom. family in the room. states pain is mild. pt left resting in bed. bed in low position, call light and phone at reach. pt stable.

## 2017-01-17 NOTE — ROUTINE PROCESS
Bedside and Verbal shift change report given to [address]  (oncoming nurse) by Kyle Burleson RN (offgoing nurse). Report included the following information SBAR, Kardex, MAR and Recent Results. SITUATION:    Code Status: Full Code   Reason for Admission: Impaired gait and mobility   Effusion of knee joint right    [facility] day: 0   Problem List:       Hospital Problems  Date Reviewed: 1/15/2017          Codes Class Noted POA    Primary osteoarthritis of right knee ICD-10-CM: M17.11  ICD-9-CM: 715.16  1/16/2017 Yes        Knee strain ICD-10-CM: W97.296X  ICD-9-CM: 844.9  1/16/2017 Unknown        Impaired gait and mobility ICD-10-CM: R26.89  ICD-9-CM: 781.2  1/15/2017 Yes    Overview Signed 1/15/2017  6:41 AM by Lashawn Izquierdo, DO     Unable to ambulate, Pain on walking after fall at home onto right knee             * (Principal)Effusion of knee joint right ICD-10-CM: M25.461  ICD-9-CM: 719.06  1/15/2017 Yes        Acute pain of right knee ICD-10-CM: M25.561  ICD-9-CM: 719.46  1/15/2017 Yes        Essential hypertension ICD-10-CM: I10  ICD-9-CM: 401.9  1/15/2017 Yes        GERD (gastroesophageal reflux disease) ICD-10-CM: K21.9  ICD-9-CM: 530.81  Unknown Yes        Hypothyroid ICD-10-CM: E03.9  ICD-9-CM: 244.9  Unknown Yes              BACKGROUND:    Past Medical History:   Past Medical History   Diagnosis Date    GERD (gastroesophageal reflux disease)     High cholesterol     Hypertension     Hypothyroid          Patient taking anticoagulants yes     ASSESSMENT:    Changes in Assessment Throughout Shift: none     Patient has Central Line: no Reasons if yes: .  Patient has [place] Cath: no Reasons if yes: .       Last Vitals:     Vitals:    01/16/17 0952 01/16/17 1551 01/16/17 1609 01/16/17 2119   BP: 120/70  130/70 117/74   Pulse: (!) 56  66 66   Resp: 16  16 18   Temp: 97 °F (36.1 °C)  97.5 °F (36.4 °C) 98.3 °F (36.8 °C)   SpO2: 98%  96% 96%   Weight:       Height:  4' 6\" (1.372 m)          IV and DRAINS (will only show if present)   Peripheral IV 01/15/17 Right Antecubital-Site Assessment: Clean, dry, & intact  [REMOVED] Peripheral IV 01/15/17 Right Arm-Site Assessment: Clean, dry, & intact     WOUND (if present)   Wound Type:  none   Dressing present Dressing Present : No   Wound Concerns/Notes:  none     PAIN    Pain Assessment    Pain Intensity 1: 8 (01/16/17 2119)    Pain Location 1: Knee    Pain Intervention(s) 1: Medication (see MAR)    Patient Stated Pain Goal: 2  o Interventions for Pain:  See MAR  o Intervention effective: yes  o Time of last intervention: see MAR   o Reassessment Completed: yes      Last 3 Weights:  Last 3 Recorded Weights in this Encounter    01/15/17 0143   Weight: 54.4 kg (120 lb)     Weight change:      INTAKE/OUPUT    Current Shift:      Last three shifts: 01/15 0701 - 01/16 1900  In: 2045.8 [I.V.:2045.8]  Out: 600 [Urine:600]     LAB RESULTS     Recent Labs      01/16/17   1133  01/15/17   0155   WBC  6.7  9.6   HGB  11.0*  12.5   HCT  34.2*  36.9   PLT  251  257        Recent Labs      01/15/17   1955  01/15/17   0155   NA  139  141   K  3.9  3.9   GLU  136*  133*   BUN  15  16   CREA  0.68  0.66   CA  9.6  9.5   INR   --   1.0       RECOMMENDATIONS AND DISCHARGE PLANNING     1. Pending tests/procedures/ Plan of Care or Other Needs: PLACEMENT     2. Discharge plan for patient and Needs/Barriers: placement    3. Estimated Discharge Date: 1/17/2017 Posted on Whiteboard in Patients Room: no      4. The patient's care plan was reviewed with the oncoming nurse. \"HEALS\" SAFETY CHECK      Fall Risk    Total Score: 3    Safety Measures: Safety Measures: Bed/Chair-Wheels locked    A safety check occurred in the patient's room between off going nurse and oncoming nurse listed above.     The safety check included the below items  Area Items   H  High Alert Medications - Verify all high alert medication drips (heparin, PCA, etc.)   E  Equipment - Suction is set up for ALL patients (with yanker)  - Red plugs utilized for all equipment (IV pumps, etc.)  - WOWs wiped down at end of shift.  - Room stocked with oxygen, suction, and other unit-specific supplies   A  Alarms - Bed alarm is set for fall risk patients  - Ensure chair alarm is in place and activated if patient is up in a chair   L  Lines - Check IV for any infiltration  - [place] bag is empty if patient has a [place]   - Tubing and IV bags are labeled   S  Safety   - Room is clean, patient is clean, and equipment is clean. - Hallways are clear from equipment besides carts. - Fall bracelet on for fall risk patients  - Ensure room is clear and free of clutter  - Suction is set up for ALL patients (with dae)  - Hallways are clear from equipment besides carts.    - Isolation precautions followed, supplies available outside room, sign posted     Lianet Gresham RN

## 2017-01-17 NOTE — PROGRESS NOTES
Problem: Mobility Impaired (Adult and Pediatric)  Goal: *Acute Goals and Plan of Care (Insert Text)  STGs to be addressed within 3 days:  1. Bed mobility: Supine to sit to supine S with HR for meals. 2. Activity tolerance: Tolerate up in chair 1-2 hrs for ADLs. 3. Transfers: Sit to stand to chair S with RW/KI for ADLs. LTGs to be addressed within 7 days:  1. Standing/Ambulation Balance: Increase to Good with RW/KI for safe transfers and gait. 2. Ambulation: Ambulate > 100 ft. S, WBAT R LE with RW/KI for home mobility. 3. Patient Education: Independent with HEP for home safety. Outcome: Progressing Towards Goal  PHYSICAL THERAPY EVALUATION     Patient: Karen Love (29 y.o. female)  Date: 1/17/2017  Primary Diagnosis: Impaired gait and mobility  Effusion of knee joint right   Precautions:   Fall, WBAT (R LE, KI in place)      ASSESSMENT :  Based on the objective data described below, the patient presents to PT with decreased functional mobility with regard to bed mobility, transfers, gait, balance, and overall tolerance for activity. Patient with very limited English, reports to be Tajik speaking only, no family at bedside to translate. Per chart, patient fell down stairs on her R knee, admitted with contusion and edema R LE. Per ortho, patient WBAT R LE with KI. Today, patient received sitting on BSC, visual instructions given for safe transfer and ambulation with RW, patient with inconsistent demonstration of comprehension. Patient ambulated ~ 10 ft mod A with RW/KI required max vc's for sequencing with RW. Patient with very limited ambulation secondary to patient with significant c/o pain in R knee. Patient was assisted back to bed, positioned for comfort. Spoke with nurse, Cassandra Clarke RN with regard to patient needing a blue phone for translation at bedside for assistance. Patient lives with daughter in a 2 story home.   Patient currently has significant mobility impairments secondary to ongoing pain in R knee, limited support at home. Strongly recommend rehab for follow-up therapy. Patient will benefit from skilled intervention to address the above impairments. Patients rehabilitation potential is considered to be Good  Factors which may influence rehabilitation potential include:   [ ]         None noted  [ ]         Mental ability/status  [ ]         Medical condition  [X]         Home/family situation and support systems  [X]         Safety awareness  [ ]         Pain tolerance/management  [X]         Other: Language barrier       PLAN :  Recommendations and Planned Interventions:  [X]           Bed Mobility Training             [X]    Neuromuscular Re-Education  [X]           Transfer Training                   [ ]    Orthotic/Prosthetic Training  [X]           Gait Training                          [ ]    Modalities  [X]           Therapeutic Exercises          [ ]    Edema Management/Control  [X]           Therapeutic Activities            [X]    Patient and Family Training/Education  [ ]           Other (comment):     Frequency/Duration: Patient will be followed by physical therapy 1-2 times per day x 4-7 x week to address goals. Discharge Recommendations: Rehab  Further Equipment Recommendations for Discharge: rolling walker       SUBJECTIVE:   Patient stated I don't speak English.       OBJECTIVE DATA SUMMARY:       Past Medical History   Diagnosis Date    GERD (gastroesophageal reflux disease)      High cholesterol      Hypertension      Hypothyroid     History reviewed. No pertinent past surgical history.   Barriers to Learning/Limitations: yes;  language  Compensate with: Visual Cues, Verbal Cues and Tactile Cues  Prior Level of Function/Home Situation:   Home Situation  Home Environment: Private residence  # Steps to Enter: 2  One/Two Story Residence: Two story  # of Interior Steps: 13  Height of Each Step (in): 6 inches  Interior Rails: Both  Lift Chair Available: No  Living Alone: No  Support Systems: Child(cherry), Family member(s)  Patient Expects to be Discharged to[de-identified] Private residence  Current DME Used/Available at Home: None  Critical Behavior:  Neurologic State: Alert  Psychosocial  Patient Behaviors: Cooperative; Restless  Purposeful Interaction: Yes  Pt Identified Daily Priority: Clinical issues (comment)  Caritas Process: Nurture loving kindness  Caring Interventions: Reassure  Reassure: Caring rounds  Therapeutic Modalities: Intentional therapeutic touch  Strength:    Strength: Generally decreased, functional (R LE 2/5, L LE 3/5)  Tone & Sensation:   Tone: Normal  Sensation: Intact (B LE intact to LT grossly)   Range Of Motion:  AROM: Within functional limits (R knee in KI NT, [facility])  Functional Mobility:  Bed Mobility:  Sit to Supine: Contact guard assistance; Additional time  Scooting: Contact guard assistance; Additional time  Transfers:  Sit to Stand: Moderate assistance;Maximum assistance; Additional time (with RW/KI)  Stand to Sit: Moderate assistance; Additional time (with RW/KI)  Balance:   Sitting: Intact; With support  Standing: Impaired;Pull to stand; With support (with RW/KI)  Standing - Static: Fair;Constant support (with RW/KI)  Standing - Dynamic : Fair (with RW/KI)  Ambulation/Gait Training:  Distance (ft): 10 Feet (ft)  Assistive Device: Gait belt;Walker, rolling;Brace/Splint  Ambulation - Level of Assistance: Moderate assistance; Additional time (extensive vc's for sequencing)  Base of Support: Widened;Shift to left  Speed/[place]ika: Pace decreased (<100 feet/min); Shuffled; Slow  Interventions: Visual/Demos; Verbal cues; Tactile cues; Safety awareness training;Manual cues  Pain:  Pain Scale 1: Numeric (0 - 10)  Pain Intensity 1: 8  Pain Location 1: Knee  Pain Orientation 1: Anterior  Pain Description 1: Aching  Pain Intervention(s) 1: Medication (see MAR)  Activity Tolerance:   Fair  Please refer to the flowsheet for vital signs taken during this treatment. After treatment:   [ ] Patient left in no apparent distress sitting up in chair  [ ] Patient left sitting on EOB  [X] Patient left in no apparent distress in bed  [ ] Patient declined to be OOB at this time due to   [X] Call bell left within reach  [X] Nursing notified(UZIEL Lopez)  [ ] Caregiver present  [ ] Bed alarm activated      COMMUNICATION/EDUCATION:   [X]         Fall prevention education was provided and the patient/caregiver indicated understanding. [X]         Patient/family have participated as able in goal setting and plan of care. [X]         Patient/family agree to work toward stated goals and plan of care. [ ]         Patient understands intent and goals of therapy, but is neutral about his/her participation. [ ]         Patient is unable to participate in goal setting and plan of care. Thank you for this referral.  Adolfo Hernandez, PT   Time Calculation: 16 mins      G-codes:  Mobility  Current  CL= 60-79%   Goal  CI= 1-19%. The severity rating is based on the Level of Assistance required for Functional Mobility and ADLs.      Eval Complexity: History: HIGH Complexity :3+ comorbidities / personal factors will impact the outcome/ POC Exam:HIGH Complexity : 4+ Standardized tests and measures addressing body structure, function, activity limitation and / or participation in recreation  Presentation: MEDIUM Complexity : Evolving with changing characteristics  Overall Complexity:MEDIUM

## 2017-01-17 NOTE — PROGRESS NOTES
Bedside and Verbal shift change report given to [address] (oncoming nurse) by Ashia Alex RN (offgoing nurse). Report included the following information SBAR, Kardex, MAR and Recent Results.     SITUATION:    Code Status: Full Code   Reason for Admission: Impaired gait and mobility   Effusion of knee joint right    [facility] day: 0   Problem List:       Hospital Problems  Date Reviewed: 1/15/2017          Codes Class Noted POA    Primary osteoarthritis of right knee ICD-10-CM: M17.11  ICD-9-CM: 715.16  1/16/2017 Yes        Knee strain ICD-10-CM: M41.107M  ICD-9-CM: 844.9  1/16/2017 Unknown        Impaired gait and mobility ICD-10-CM: R26.89  ICD-9-CM: 781.2  1/15/2017 Yes    Overview Signed 1/15/2017  6:41 AM by Katelin Cisse, DO     Unable to ambulate, Pain on walking after fall at home onto right knee             * (Principal)Effusion of knee joint right ICD-10-CM: M25.461  ICD-9-CM: 719.06  1/15/2017 Yes        Acute pain of right knee ICD-10-CM: M25.561  ICD-9-CM: 719.46  1/15/2017 Yes        Essential hypertension ICD-10-CM: I10  ICD-9-CM: 401.9  1/15/2017 Yes        GERD (gastroesophageal reflux disease) ICD-10-CM: K21.9  ICD-9-CM: 530.81  Unknown Yes        Hypothyroid ICD-10-CM: E03.9  ICD-9-CM: 244.9  Unknown Yes              BACKGROUND:    Past Medical History:   Past Medical History   Diagnosis Date    GERD (gastroesophageal reflux disease)     High cholesterol     Hypertension     Hypothyroid          Patient taking anticoagulants no     ASSESSMENT:    Changes in Assessment Throughout Shift: no     Patient has Central Line: no Reasons if yes: n/a   Patient has Reno Cath: no Reasons if yes: n/a      Last Vitals:     Vitals:    01/16/17 1551 01/16/17 1609 01/16/17 2119 01/17/17 0400   BP:  130/70 117/74 99/55   Pulse:  66 66 64   Resp:  16 18 18   Temp:  97.5 °F (36.4 °C) 98.3 °F (36.8 °C) 97.8 °F (36.6 °C)   SpO2:  96% 96% 94%   Weight:       Height: 4' 6\" (1.372 m)  IV and DRAINS (will only show if present)   Peripheral IV 01/15/17 Right Antecubital-Site Assessment: Clean, dry, & intact  [REMOVED] Peripheral IV 01/15/17 Right Arm-Site Assessment: Clean, dry, & intact     WOUND (if present)   Wound Type:  none   Dressing present Dressing Present : No   Wound Concerns/Notes:  none     PAIN    Pain Assessment    Pain Intensity 1: 6 (01/17/17 0422)    Pain Location 1: Knee    Pain Intervention(s) 1: Medication (see MAR)    Patient Stated Pain Goal: 2  o Interventions for Pain:  none  o Intervention effective: no  o Time of last intervention: 0421   o Reassessment Completed: no      Last 3 Weights:  Last 3 Recorded Weights in this Encounter    01/15/17 0143   Weight: 54.4 kg (120 lb)     Weight change:      INTAKE/OUPUT    Current Shift:      Last three shifts: 01/15 0701 - 01/16 1900  In: 2045.8 [I.V.:2045.8]  Out: 850 [Urine:850]     LAB RESULTS     Recent Labs      01/17/17   0625  01/16/17   1133  01/15/17   0155   WBC  9.0  6.7  9.6   HGB  10.7*  11.0*  12.5   HCT  32.9*  34.2*  36.9   PLT  236  251  257        Recent Labs      01/17/17   0625  01/15/17   1955  01/15/17   0155   NA  138  139  141   K  3.9  3.9  3.9   GLU  133*  136*  133*   BUN  9  15  16   CREA  0.63  0.68  0.66   CA  8.9  9.6  9.5   INR   --    --   1.0       RECOMMENDATIONS AND DISCHARGE PLANNING     1. Pending tests/procedures/ Plan of Care or Other Needs: PT/OT     2. Discharge plan for patient and Needs/Barriers: SNF/Home trish    3. Estimated Discharge Date: 1/17/17 Posted on Whiteboard in Patients Room: no      4. The patient's care plan was reviewed with the oncoming nurse. \"HEALS\" SAFETY CHECK      Fall Risk    Total Score: 3    Safety Measures: Safety Measures: Bed in low position    A safety check occurred in the patient's room between off going nurse and oncoming nurse listed above.     The safety check included the below items  Area Items   H  High Alert Medications - Verify all high alert medication drips (heparin, PCA, etc.)   E  Equipment - Suction is set up for ALL patients (with dae)  - Red plugs utilized for all equipment (IV pumps, etc.)  - WOWs wiped down at end of shift.  - Room stocked with oxygen, suction, and other unit-specific supplies   A  Alarms - Bed alarm is set for fall risk patients  - Ensure chair alarm is in place and activated if patient is up in a chair   L  Lines - Check IV for any infiltration  - Reno bag is empty if patient has a Reno   - Tubing and IV bags are labeled   S  Safety   - Room is clean, patient is clean, and equipment is clean. - Hallways are clear from equipment besides carts. - Fall bracelet on for fall risk patients  - Ensure room is clear and free of clutter  - Suction is set up for ALL patients (with dae)  - Hallways are clear from equipment besides carts.    - Isolation precautions followed, supplies available outside room, sign posted     Vasiliy Walker RN

## 2017-01-17 NOTE — PROGRESS NOTES
Problem: Self Care Deficits Care Plan (Adult)  Goal: *Acute Goals and Plan of Care (Insert Text)  Outcome: Resolved/Met Date Met:  01/17/17  OCCUPATIONAL THERAPY EVALUATION/DISCHARGE     Patient: Araseli Meyer (39 y.o. female)  Date: 1/17/2017  Primary Diagnosis: Impaired gait and mobility  Effusion of knee joint right   Precautions: Fall, WBAT (R LE, KI in place)      ASSESSMENT AND RECOMMENDATIONS:  Based on the objective data described below, the patient presents with out functional deficits; therefore, skilled occupational therapy is not indicated at this time. Discharge Recommendations: defer to PT  Further Equipment Recommendations for Discharge: N/A       COMPLEXITY      Eval Complexity: History: LOW Complexity : Brief history review ; Examination: LOW Complexity : 1-3 performance deficits relating to physical, cognitive , or psychosocial skils that result in activity limitations and / or participation restrictions ; Decision Making:LOW Complexity : No comorbidities that affect functional and no verbal or physical assistance needed to complete eval tasks  Assessment: LOW Complexity          G-CODES:      Self Care  Current  CI= 1-19%   Goal  CI= 1-19%   D/C  CI= 1-19%. The severity rating is based on the Level of Assistance required for Functional Mobility and ADLs. SUBJECTIVE:   Patient stated OK.  when demonstrated what was being asked of her to demonstrate for OT evaluation      OBJECTIVE DATA SUMMARY:       Past Medical History   Diagnosis Date    GERD (gastroesophageal reflux disease)      High cholesterol      Hypertension      Hypothyroid     History reviewed. No pertinent past surgical history.   Prior Level of Function/Home Situation:   Home Situation  Home Environment: Private residence  # Steps to Enter: 2  One/Two Story Residence: Two story  # of Interior Steps: 13  Height of Each Step (in): 6 inches  Interior Rails: Both  Lift Chair Available: No  Living Alone: No  Support Systems: Child(cherry), Family member(s)  Patient Expects to be Discharged to[de-identified] Private residence  Current DME Used/Available at Home: None  [X]     Right hand dominant       [ ]     Left hand dominant  Cognitive/Behavioral Status:  Alert, oriented X 4; English is her second language and she benefits from demonstration  Skin: no skin integrity issues noted during OT evaluation; knee brace is in place  Edema: no extremity edema is noted  Vision/Perceptual:     tracking is WFL     Coordination:  BUE's WFL  Balance: independent standing for LB clothes managment  Sitting: Intact; With support  Standing: Impaired;Pull to stand; With support (with RW/KI)  Standing - Static: Fair;Constant support (with RW/KI)  Standing - Dynamic : Fair (with RW/KI)  Strength:  BUE's: 4/5  Tone & Sensation:  BUE's WFL  Range of Motion:  BUE's AROM is WFL     Functional Mobility and Transfers for ADLs:  Bed Mobility:   supine to sit and return to supine is modified independent (additional time)   Transfers:  Modified independent sit to stand and return to sit (additional time)  ADL Assessment:   self feeding: independent (simulated)  Grooming: set up secondary to decreased standing this afternoon  UB bathing/dressing: independent (simulated)  LB bathing/dressing: modified independent (additional time with knee brace in place)  Pain:  Pt reports 0/10 pain or discomfort prior to treatment. Pt reports 0/10 pain or discomfort post treatment. She presented with grimacing during functional mobility tasks  Activity Tolerance:   No SOB noted  Please refer to the flowsheet for vital signs taken during this treatment.   After treatment:   [ ]  Patient left in no apparent distress sitting up in chair  [X]  Patient left in no apparent distress in bed  [ ]  Call bell left within reach  [ ]  Nursing notified  [ ]  Caregiver present  [ ]  Bed alarm activated      COMMUNICATION/EDUCATION:   Communication/Collaboration:  [ ]      Home safety education was provided and the patient/caregiver indicated understanding. [X]      Patient/family have participated as able and agree with findings and recommendations. [ ]      Patient is unable to participate in plan of care at this time.      Adrian Catherine, OTR/L  Time Calculation: 8 mins

## 2017-01-17 NOTE — PROGRESS NOTES
Pt states she is willing to go to a SNF. FOC given and signed by pt and she chose [facility]. Information sent to [facility]. CM to follow.

## 2017-01-17 NOTE — PROGRESS NOTES
FREDDIE ORTHO      [facility] resting ar this time. She states less right knee pain. Visit Vitals    BP 99/55 (BP 1 Location: Left arm, BP Patient Position: At rest)    Pulse 64    Temp 97.8 °F (36.6 °C)    Resp 18    Ht 4' 6\" (1.372 m)    Wt 120 lb (54.4 kg)    SpO2 94%    Breastfeeding No     On examination 1/17/2017 , the patient is alert, oriented (name, place, time) and follows commands. she is in no acute distress and her affect and mood are appropriate. Right knee\: in brace. Less swelling to right knee.  to [facility] and medial parapatellar retinaculum    Soft bilateral calf      Impression:    Right knee OCA, right knee strain    1. Mobilize with walker. WBAT with walker  2. Will sign off at this point.     Nathalie Wisdom MD  1/17/2017  6:52 AM

## 2017-01-17 NOTE — PROGRESS NOTES
Care Management Interventions  PCP Verified by CM: Yes  Mode of Transport at Discharge: [facility]  Transition of Care Consult (CM Consult): [address]: No  Reason Outside [place]: Physician referred to specific agency  [place] Signup: No  Discharge Durable Medical Equipment: No  Physical Therapy Consult: Yes  Occupational Therapy Consult: Yes  Speech Therapy Consult: No  Current Support Network: Relative's Home (Lives with her daughter. )  Confirm Follow Up Transport: Family  Plan discussed with Pt/Family/Caregiver: Yes  Freedom of Choice Offered: Yes  Discharge Location  Discharge Placement: Home with home health     78 y/o female admitted with impaired gait and mobility/effusion of right knee joint. Pt speaks Greek and I spoke with her using the blue phone. Pt is on Observation Status. She signed the Patient Guide to Observation & Outpatient Care form and than it was placed into pt's light blue chart. Copied given to pt. Pt is a [facility] pt. She states she hasn't needed any DME or HH in the past. She states she lives with her daughter and plans on going back home with [facility] at discharge. FOC given and signed by pt and she chose [facility]. [facility] notified. Catia with [facility] notified of RW. Hari notified of pt needing PT and OT today.

## 2017-01-17 NOTE — PROGRESS NOTES
Hospitalist Progress Note    Patient: Gilles Ramírez Age: 77 y.o. : 1940 MR#: [id number] SSN: [id number]  Date/Time: 2017 5:24 PM    Subjective:     Patient resting in bed with right knee brace on  Denies acute distress    Review of Systems -   General ROS: negative for chills, fever, sleep disturbance, nausea or abdominal pain. Right knee pain on walking with walker    Objective:   VS:   Visit Vitals    /70 (BP 1 Location: Left arm, BP Patient Position: At rest)    Pulse 68    Temp 99.2 °F (37.3 °C)    Resp 18    Ht 4' 6\" (1.372 m)    Wt 54.4 kg (120 lb)    SpO2 97%    Breastfeeding No      Tmax/24hrs: Temp (24hrs), Av.8 °F (37.1 °C), Min:97.8 °F (36.6 °C), Max:100.2 °F (37.9 °C)       Intake/Output Summary (Last 24 hours) at 17 1724  Last data filed at 17 1616   Gross per 24 hour   Intake                0 ml   Output              450 ml   Net             -450 ml       General: alert and oriented ×3  HEENT:speech clear, no scleral icterus, conjunctiva clear, no focal deficits detected, negative JVD  Cardiovascular:heart rate regular    Pulmonary: lungs clear to bases   GI:  Soft, non tender, bowel sounds positive  Extremities: hinged knee brace on RLE, nontender, pulses positive     Labs:    Recent Results (from the past 24 hour(s))   METABOLIC PANEL, COMPREHENSIVE    Collection Time: 17  6:25 AM   Result Value Ref Range    Sodium 138 136 - 145 mmol/L    Potassium 3.9 3.5 - 5.5 mmol/L    Chloride 108 100 - 108 mmol/L    CO2 22 21 - 32 mmol/L    Anion gap 8 3.0 - 18 mmol/L    Glucose 133 (H) 74 - 99 mg/dL    BUN 9 7.0 - 18 MG/DL    Creatinine 0.63 0.6 - 1.3 MG/DL    BUN/Creatinine ratio 14 12 - 20      GFR est AA >60 >60 ml/min/1.73m2    GFR est non-AA >60 >60 ml/min/1.73m2    Calcium 8.9 8.5 - 10.1 MG/DL    Bilirubin, total 0.2 0.2 - 1.0 MG/DL    ALT 69 (H) 13 - 56 U/L    AST 55 (H) 15 - 37 U/L    Alk.  phosphatase 129 (H) 45 - 117 U/L    Protein, total 6.5 6.4 - 8.2 g/dL    Albumin 2.8 (L) 3.4 - 5.0 g/dL    Globulin 3.7 2.0 - 4.0 g/dL    A-G Ratio 0.8 0.8 - 1.7     CBC WITH AUTOMATED DIFF    Collection Time: 01/17/17  6:25 AM   Result Value Ref Range    WBC 9.0 4.6 - 13.2 K/uL    RBC 4.04 (L) 4.20 - 5.30 M/uL    HGB 10.7 (L) 12.0 - 16.0 g/dL    HCT 32.9 (L) 35.0 - 45.0 %    MCV 81.4 74.0 - 97.0 FL    MCH 26.5 24.0 - 34.0 PG    MCHC 32.5 31.0 - 37.0 g/dL    RDW 15.4 (H) 11.6 - 14.5 %    PLATELET 047 355 - 317 K/uL    MPV 10.1 9.2 - 11.8 FL    NEUTROPHILS 70 40 - 73 %    LYMPHOCYTES 17 (L) 21 - 52 %    MONOCYTES 11 (H) 3 - 10 %    EOSINOPHILS 2 0 - 5 %    BASOPHILS 0 0 - 2 %    ABS. NEUTROPHILS 6.3 1.8 - 8.0 K/UL    ABS. LYMPHOCYTES 1.5 0.9 - 3.6 K/UL    ABS. MONOCYTES 1.0 0.05 - 1.2 K/UL    ABS. EOSINOPHILS 0.2 0.0 - 0.4 K/UL    ABS. BASOPHILS 0.0 0.0 - 0.06 K/UL    DF AUTOMATED         Assessment/Plan:   Contusion right knee with effusion  Hinged right knee brace on  Orthopedic follow-up appreciated  Pain controlled  Ms. Rosario is medically stable for discharge today. OT evaluation done and defers to PT recs  Physical therapy evaluation appreciated,  Recommendation is for rehab  Patient wanr to go to [id number] has working with patient  Clinicals over to facility this evening.   Auth pending      Additional Notes:      Full Code    [facility]    Patient's daughter, [phone]    Case discussed with:  [x]Patient  []Family  [x]Nursing  []Case Management  DVT Prophylaxis:  []Lovenox  [x]Hep SQ  []SCDs  []Coumadin   []On Heparin gtt    Signed By: Milvia Govea DO     January 17, 2017  5:24 PM

## 2017-01-17 NOTE — PROGRESS NOTES
Patient complaining 8/10 right knee pain, medicated with morphine iv ,patient assisted to the bathroom voided 200 ml of diego and foul odor.

## 2017-01-18 PROCEDURE — 99218 HC RM OBSERVATION: CPT

## 2017-01-18 PROCEDURE — 96372 THER/PROPH/DIAG INJ SC/IM: CPT

## 2017-01-18 PROCEDURE — 74011250637 HC RX REV CODE- 250/637: Performed by: HOSPITALIST

## 2017-01-18 PROCEDURE — 74011250636 HC RX REV CODE- 250/636: Performed by: HOSPITALIST

## 2017-01-18 PROCEDURE — 74011000258 HC RX REV CODE- 258: Performed by: HOSPITALIST

## 2017-01-18 PROCEDURE — 96376 TX/PRO/DX INJ SAME DRUG ADON: CPT

## 2017-01-18 PROCEDURE — 97530 THERAPEUTIC ACTIVITIES: CPT

## 2017-01-18 RX ADMIN — AMLODIPINE BESYLATE 10 MG: 10 TABLET ORAL at 09:57

## 2017-01-18 RX ADMIN — PRAVASTATIN SODIUM 20 MG: 20 TABLET ORAL at 09:58

## 2017-01-18 RX ADMIN — Medication 2 MG: at 02:10

## 2017-01-18 RX ADMIN — LOSARTAN POTASSIUM 100 MG: 25 TABLET, FILM COATED ORAL at 09:58

## 2017-01-18 RX ADMIN — Medication 10 ML: at 22:07

## 2017-01-18 RX ADMIN — HEPARIN SODIUM 5000 UNITS: 5000 INJECTION, SOLUTION INTRAVENOUS; SUBCUTANEOUS at 17:32

## 2017-01-18 RX ADMIN — SENNOSIDES 17.2 MG: 8.6 TABLET, FILM COATED ORAL at 22:06

## 2017-01-18 RX ADMIN — HEPARIN SODIUM 5000 UNITS: 5000 INJECTION, SOLUTION INTRAVENOUS; SUBCUTANEOUS at 23:54

## 2017-01-18 RX ADMIN — ASPIRIN 81 MG: 81 TABLET, COATED ORAL at 09:58

## 2017-01-18 RX ADMIN — Medication 10 ML: at 06:00

## 2017-01-18 RX ADMIN — PANTOPRAZOLE SODIUM 40 MG: 40 TABLET, DELAYED RELEASE ORAL at 20:11

## 2017-01-18 RX ADMIN — LEVOTHYROXINE SODIUM 50 MCG: 50 TABLET ORAL at 09:58

## 2017-01-18 RX ADMIN — TRAMADOL HYDROCHLORIDE 50 MG: 50 TABLET, FILM COATED ORAL at 06:06

## 2017-01-18 RX ADMIN — DEXTROSE MONOHYDRATE AND SODIUM CHLORIDE 125 ML/HR: 5; .9 INJECTION, SOLUTION INTRAVENOUS at 05:28

## 2017-01-18 RX ADMIN — TRAMADOL HYDROCHLORIDE 50 MG: 50 TABLET, FILM COATED ORAL at 20:11

## 2017-01-18 RX ADMIN — ATENOLOL 25 MG: 50 TABLET ORAL at 09:57

## 2017-01-18 RX ADMIN — HEPARIN SODIUM 5000 UNITS: 5000 INJECTION, SOLUTION INTRAVENOUS; SUBCUTANEOUS at 09:57

## 2017-01-18 NOTE — PROGRESS NOTES
Hospitalist Progress Note    Patient: Jody Castro Age: 77 y.o. : 1940 MR#: [id number] SSN: [id number]  Date/Time: 2017 5:44 PM    Subjective:   Timbo Hart is a 77year old female who was presented to the ER on [place] 1/15/17 unable to walk on her right leg after a fall at home 17. Xray with questionable fracture not seen on CT at tibial plateau. Dr Lexi Omalley evaluated the patient and for contusion and effusion; arranged for a hinged right knee brace for 17. Plan was keep for observation pending PT recommendations, and training in rolling walker. PT consult changed tp stat at noon time 17. PT evaluated patient 10 AM 17, and recommended rehab, clinicals submitted to CM 1PM, OT deferred plan to PT and submitted clinicals to  16:50 PM 17. The  processed the clinical to  rehab last evening. Advised that Dudley Manley was pending today, however, transition team tried to expedite auth, and was advised none was submitted up to 14:30 pm today. Ms. Sheri Jarrell remains stable for discharge. Patient had good appetite at dinner. Was OOB to [facility] with help. Denies acute distress    Review of Systems -   General ROS: negative for chills, fever, sleep disturbance, nausea or abdominal pain. Left knee soreness today  Had large BM today.     Objective:   VS:   Visit Vitals    /77 (BP 1 Location: Right arm, BP Patient Position: At rest)    Pulse 80    Temp 97.6 °F (36.4 °C)    Resp 18    Ht 4' 6\" (1.372 m)    Wt 54.4 kg (120 lb)    SpO2 95%    Breastfeeding No      Tmax/24hrs: Temp (24hrs), Av.4 °F (36.9 °C), Min:97.6 °F (36.4 °C), Max:99.2 °F (37.3 °C)       Intake/Output Summary (Last 24 hours) at 17 1744  Last data filed at 17 0544   Gross per 24 hour   Intake              340 ml   Output             2300 ml   Net            -1960 ml       General: alert and oriented ×3  HEENT:speech clear, no scleral icterus, conjunctiva clear, no focal deficits detected, negative JVD  Cardiovascular:heart rate regular    Pulmonary: lungs clear to bases   GI:  Soft, non tender, bowel sounds positive  Extremities: hinged knee brace on RLE, calf nontender, pulses positive, left knee tenderness, no ecchymosis    Assessment/Plan:   Contusion right knee with effusion  Hinged right knee brace on  Orthopedics has signed off  Pain controlled  Ms. Rosario medically stable for discharge x 2 days  OT evaluation done and defers to PT recs  Physical therapy evaluation appreciated,  Recommendation is for rehab  Patient requested [facility]  CM working with patient  Clinicals over to facility last evening. Additional Notes:      Full Code    [facility]?     Patient's daughter, [phone]    Case discussed with:  [x]Patient  []Family  [x]Nursing  []Case Management  DVT Prophylaxis:  []Lovenox  [x]Hep SQ  []SCDs  []Coumadin   []On Heparin gtt    Signed By: Milvia Govea DO     January 18, 2017  5:44 PM

## 2017-01-18 NOTE — PROGRESS NOTES
Problem: Mobility Impaired (Adult and Pediatric)  Goal: *Acute Goals and Plan of Care (Insert Text)  STGs to be addressed within 3 days:  1. Bed mobility: Supine to sit to supine S with HR for meals. 2. Activity tolerance: Tolerate up in chair 1-2 hrs for ADLs. 3. Transfers: Sit to stand to chair S with RW/KI for ADLs. LTGs to be addressed within 7 days:  1. Standing/Ambulation Balance: Increase to Good with RW/KI for safe transfers and gait. 2. Ambulation: Ambulate > 100 ft. S, WBAT R LE with RW/KI for home mobility. 3. Patient Education: Independent with HEP for home safety. Outcome: Progressing Towards Goal  PHYSICAL THERAPY TREATMENT     Patient: Diane Gama (89 y.o. female)  Date: 1/18/2017  Diagnosis: Impaired gait and mobility  Effusion of knee joint right Effusion of knee joint right       Precautions: Fall, WBAT (R LE, KI in place)  Chart, physical therapy assessment, plan of care and goals were reviewed. ASSESSMENT:  Pt limited today by bilat knee pain, pt reporting greater pain in L than R knee this am.  Pt overall required decreased level of assist with bed mobility, transfers and gait, although required verbal, visual and manual cues for safe transfers and amb with RW safely. Pt with good static standing balance this am, able to stand unsupported for clothing management using BSC with close guarding. Progression toward goals:  [X]      Improving appropriately and progressing toward goals  [ ]      Improving slowly and progressing toward goals  [ ]      Not making progress toward goals and plan of care will be adjusted       PLAN:  Patient continues to benefit from skilled intervention to address the above impairments. Continue treatment per established plan of care. Discharge Recommendations:  Home Health with assistance and [facility]  Further Equipment Recommendations for Discharge:  rolling walker       SUBJECTIVE:   Patient stated Why (does) this knee hurt? Dorcas Mckinnon Pointing to L  Mobility  Current  CJ= 20-39%   Goal  CI= 1-19%. The severity rating is based on the Other level of assist needed for functional mobility      OBJECTIVE DATA SUMMARY:   Critical Behavior:  Neurologic State: Alert  Orientation Level: Oriented X4  Cognition: Appropriate decision making  Safety/Judgement: Decreased insight into deficits, Decreased awareness of need for assistance, Decreased awareness of need for safety, Fall prevention  Functional Mobility Training:  Bed Mobility:  Rolling: Supervision  Supine to Sit: Supervision  Scooting: Supervision  Transfers:  Sit to Stand: Stand-by asssistance  Stand to Sit: Stand-by asssistance  Balance:  Sitting: Intact  Standing: Impaired  Standing - Static: Good  Standing - Dynamic : Fair  Ambulation/Gait Training:  Distance (ft): 6 Feet (ft) (X2)  Assistive Device: Walker, rolling;Brace/Splint  Ambulation - Level of Assistance: Contact guard assistance  Gait Abnormalities: Antalgic;Decreased step clearance;Shuffling gait; Step to gait  Base of Support: Center of gravity altered; Widened  Speed/Zuleika: Slow;Shuffled  Interventions: Visual/Demos  Therapeutic Exercises: Ankle pumps  Pain:  Pt pain was reported as  5 pre-treatment. Pt pain was reported as 7 post-treatment. Intervention: nurse present, pt declined ice     Activity Tolerance:   Fair   Please refer to the flowsheet for vital signs taken during this treatment.   After treatment:   [X] Patient left in no apparent distress sitting up in chair  [ ] Patient left in no apparent distress in bed  [X] Call bell left within reach  [X] Nursing present  [ ] Caregiver present  [ ] Bed alarm activated      Angela Alatorre PTA   Time Calculation: 13 mins

## 2017-01-18 NOTE — PROGRESS NOTES
2103  Received pt in stable condition,   General: lying in bed in supine, not apparent distress, 0 pain, voiced no compliants at this time. Neuro: AOx4, denies numbness, 0 tingling, able to wiggle toes to bilateral extremities  Cardio: pedal pulses palpable, denies chest pain, cap refill < 3 sec  Respiratory: in room air, denies shortness of breath  Skin: dry and intact  GI: voiding, clear, yellow urine, no odor  : denies nausea and vomiting  Mus: ambulates c assistance, knee immobilizer in place and intact  Bed in low position and call bell within reach. 0000  Patient AOx4, no apparent disstress, voiced no c/o at this time, right knee immobilizer clean, dry and intact. Bilateral lower extremities: pedal pulses present and palpable, denies numbness, able to wiggle toes. No changes in status, in stable condition. 0200  Patient alert, assisted to bedside commode and tolerated well, pain medication given and tolerated well. Pedal pulses present and palpable, denies numbness, able to wiggle toes. No changes in status, in stable condition. 0606  Pt alert, NAD, stable, pain med given, knee immobilizer in place and intact, in stable condition.

## 2017-01-18 NOTE — PROGRESS NOTES
Pain score:  3 OUT 10    Name and time of last pain med given: Morphine 2 mg 1505      Neuro status: intact, no deficit noted     Dressing/incision status:  No surgical site    CHIVO/Hemovace output:  No drain cc    Voiding status:  Voiding without difficulties    Mobility status:  Up with assistance    Other:  Received report from [place] at 1500, Patient is alert and oriented x 4, pedal pulses present and palpable, abdomen soft, patient denies any acute distress, no apparent distress noted, no neurological deficit noted, patient in stable condition, call bell within reach, will continue to monitor.

## 2017-01-18 NOTE — PROGRESS NOTES
conducted a Follow up consultation and Spiritual Assessment for Johnny Koenig, who is a 77 y.o.,female. The  provided the following Interventions:  Continued the relationship of care and support. Listened empathically. Offered prayer and assurance of continued prayer on patients behalf. Chart reviewed. The following outcomes were achieved:  Patient expressed gratitude for 's visit. Assessment:  There are no further spiritual or Judaism issues which require Spiritual Care Services interventions at this time. Plan:  Chaplains will continue to follow and will provide pastoral care on an as needed/requested basis.  recommends bedside caregivers page  on duty if patient shows signs of acute spiritual or emotional distress.        [address] [facility] 252 [phone]

## 2017-01-18 NOTE — ROUTINE PROCESS
Bedside and Verbal shift change report given to [address] [place] [address] (oncoming nurse) by Carlos Alberto Herr RN (offgoing nurse). Report included the following information SBAR, Kardex, MAR and Recent Results.     SITUATION:    Code Status: Full Code   Reason for Admission: Impaired gait and mobility   Effusion of knee joint right    [facility] day: 0   Problem List:       Hospital Problems  Date Reviewed: 1/15/2017          Codes Class Noted POA    Primary osteoarthritis of right knee ICD-10-CM: M17.11  ICD-9-CM: 715.16  1/16/2017 Yes        Knee strain ICD-10-CM: I78.445S  ICD-9-CM: 844.9  1/16/2017 Unknown        Impaired gait and mobility ICD-10-CM: R26.89  ICD-9-CM: 781.2  1/15/2017 Yes    Overview Signed 1/15/2017  6:41 AM by Tamiko Acharya, DO     Unable to ambulate, Pain on walking after fall at home onto right knee             * (Principal)Effusion of knee joint right ICD-10-CM: M25.461  ICD-9-CM: 719.06  1/15/2017 Yes        Acute pain of right knee ICD-10-CM: M25.561  ICD-9-CM: 719.46  1/15/2017 Yes        Essential hypertension ICD-10-CM: I10  ICD-9-CM: 401.9  1/15/2017 Yes        GERD (gastroesophageal reflux disease) ICD-10-CM: K21.9  ICD-9-CM: 530.81  Unknown Yes        Hypothyroid ICD-10-CM: E03.9  ICD-9-CM: 244.9  Unknown Yes              BACKGROUND:    Past Medical History:   Past Medical History   Diagnosis Date    GERD (gastroesophageal reflux disease)     High cholesterol     Hypertension     Hypothyroid          Patient taking anticoagulants no     ASSESSMENT:    Changes in Assessment Throughout Shift: NO     Patient has Central Line: no Reasons if yes: NO   Patient has [place] Cath: no Reasons if yes: NO     Last Vitals:     Vitals:    01/17/17 0717 01/17/17 1137 01/17/17 1524 01/17/17 1920   BP: 125/73 123/74 120/70 127/72   Pulse: 68 64 68 73   Resp: 19 20 18 17   Temp: 98.4 °F (36.9 °C) 100.2 °F (37.9 °C) 99.2 °F (37.3 °C) 99.1 °F (37.3 °C)   SpO2: 96% 98% 97% 98%   Weight:       Height:  IV and DRAINS (will only show if present)   Peripheral IV 01/15/17 Right Antecubital-Site Assessment: Clean, dry, & intact  [REMOVED] Peripheral IV 01/15/17 Right Arm-Site Assessment: Clean, dry, & intact     WOUND (if present)   Wound Type:  none, NONE   Dressing present Dressing Present : No   Wound Concerns/Notes:  none     PAIN    Pain Assessment    Pain Intensity 1: 8 (01/17/17 1512)    Pain Location 1: Knee    Pain Intervention(s) 1: Medication (see MAR)    Patient Stated Pain Goal: 2  o Interventions for Pain:  none, MEDS  o Intervention effective: yes  o Time of last intervention: SEE MAR   o Reassessment Completed: yes      Last 3 Weights:  Last 3 Recorded Weights in this Encounter    01/15/17 0143   Weight: 54.4 kg (120 lb)     Weight change:      INTAKE/OUPUT    Current Shift: 01/17 1901 - 01/18 0700  In: 240 [P.O.:240]  Out: 500 [Urine:500]    Last three shifts: 01/16 0701 - 01/17 1900  In: 865.8 [P.O.:120; I.V.:745.8]  Out: 1050 [Urine:1050]     LAB RESULTS     Recent Labs      01/17/17   0625  01/16/17   1133  01/15/17   0155   WBC  9.0  6.7  9.6   HGB  10.7*  11.0*  12.5   HCT  32.9*  34.2*  36.9   PLT  236  251  257        Recent Labs      01/17/17   0625  01/15/17   1955  01/15/17   0155   NA  138  139  141   K  3.9  3.9  3.9   GLU  133*  136*  133*   BUN  9  15  16   CREA  0.63  0.68  0.66   CA  8.9  9.6  9.5   INR   --    --   1.0       RECOMMENDATIONS AND DISCHARGE PLANNING     1. Pending tests/procedures/ Plan of Care or Other Needs: LABS     2. Discharge plan for patient and Needs/Barriers: REHAB    3. Estimated Discharge Date: 1/19/17 Posted on Whiteboard in [facility]: yes      4. The patient's care plan was reviewed with the oncoming nurse.        \"HEALS\" SAFETY CHECK      Fall Risk    Total Score: 3    Safety Measures: Safety Measures: Bed in low position, Bed/Chair-Wheels locked, Call light within reach, Fall prevention (comment), Gripper socks    A safety check occurred in the patient's room between off going nurse and oncoming nurse listed above. The safety check included the below items  Area Items   H  High Alert Medications - Verify all high alert medication drips (heparin, PCA, etc.)   E  Equipment - Suction is set up for ALL patients (with dae)  - Red plugs utilized for all equipment (IV pumps, etc.)  - WOWs wiped down at end of shift.  - Room stocked with oxygen, suction, and other unit-specific supplies   A  Alarms - Bed alarm is set for fall risk patients  - Ensure chair alarm is in place and activated if patient is up in a chair   L  Lines - Check IV for any infiltration  - Reno bag is empty if patient has a Reno   - Tubing and IV bags are labeled   S  Safety   - Room is clean, patient is clean, and equipment is clean. - Hallways are clear from equipment besides carts. - Fall bracelet on for fall risk patients  - Ensure room is clear and free of clutter  - Suction is set up for ALL patients (with dae)  - Hallways are clear from equipment besides carts.    - Isolation precautions followed, supplies available outside room, sign posted     Ashly Reyes RN

## 2017-01-18 NOTE — PROGRESS NOTES
Bedside and Verbal shift change report given to UZIEL Orona (oncoming nurse) by Sara Posey RN (offgoing nurse). Report included the following information SBAR, Kardex, MAR and Recent Results.     SITUATION:    Code Status: Full Code   Reason for Admission: Impaired gait and mobility   Effusion of knee joint right    [facility] day: 0   Problem List:       Hospital Problems  Date Reviewed: 1/15/2017          Codes Class Noted POA    Primary osteoarthritis of right knee ICD-10-CM: M17.11  ICD-9-CM: 715.16  1/16/2017 Yes        Knee strain ICD-10-CM: O37.021Q  ICD-9-CM: 844.9  1/16/2017 Unknown        Impaired gait and mobility ICD-10-CM: R26.89  ICD-9-CM: 781.2  1/15/2017 Yes    Overview Signed 1/15/2017  6:41 AM by Hector Fam, DO     Unable to ambulate, Pain on walking after fall at home onto right knee             * (Principal)Effusion of knee joint right ICD-10-CM: M25.461  ICD-9-CM: 719.06  1/15/2017 Yes        Acute pain of right knee ICD-10-CM: M25.561  ICD-9-CM: 719.46  1/15/2017 Yes        Essential hypertension ICD-10-CM: I10  ICD-9-CM: 401.9  1/15/2017 Yes        GERD (gastroesophageal reflux disease) ICD-10-CM: K21.9  ICD-9-CM: 530.81  Unknown Yes        Hypothyroid ICD-10-CM: E03.9  ICD-9-CM: 244.9  Unknown Yes              BACKGROUND:    Past Medical History:   Past Medical History   Diagnosis Date    GERD (gastroesophageal reflux disease)     High cholesterol     Hypertension     Hypothyroid          Patient taking anticoagulants yes    ASSESSMENT:    Changes in Assessment Throughout Shift: none     Patient has Central Line: no Reasons if yes:      Patient has [place] Cath: no Reasons if yes:          Last Vitals:     Vitals:    01/17/17 1137 01/17/17 1524 01/17/17 1920 01/18/17 0501   BP: 123/74 120/70 127/72 132/72   Pulse: 64 68 73 65   Resp: 20 18 17 19   Temp: 100.2 °F (37.9 °C) 99.2 °F (37.3 °C) 99.1 °F (37.3 °C) 98.1 °F (36.7 °C)   SpO2: 98% 97% 98% 98%   Weight:       Height:  IV and DRAINS (will only show if present)   [REMOVED] Peripheral IV 01/15/17 Right Antecubital-Site Assessment: Clean, dry, & intact  [REMOVED] Peripheral IV 01/15/17 Right Arm-Site Assessment: Clean, dry, & intact  Peripheral IV 01/18/17 Left Antecubital-Site Assessment: Clean, dry, & intact     WOUND (if present)   Wound Type:  none   Dressing present Dressing Present : No   Wound Concerns/Notes:  none     PAIN    Pain Assessment    Pain Intensity 1: 7 (01/18/17 0603)    Pain Location 1: Hip    Pain Intervention(s) 1: Medication (see MAR)    Patient Stated Pain Goal: 0  o Interventions for Pain:  medication  o Intervention effective: yes  o Time of last intervention: see mar   o Reassessment Completed: yes      Last 3 Weights:  Last 3 Recorded Weights in this Encounter    01/15/17 0143   Weight: 54.4 kg (120 lb)     Weight change:      INTAKE/OUPUT    Current Shift: 01/17 1901 - 01/18 0700  In: 340 [P.O.:340]  Out: 2300 [Urine:2300]    Last three shifts: 01/16 0701 - 01/17 1900  In: 865.8 [P.O.:120; I.V.:745.8]  Out: 1050 [Urine:1050]     LAB RESULTS     Recent Labs      01/17/17   0625  01/16/17   1133   WBC  9.0  6.7   HGB  10.7*  11.0*   HCT  32.9*  34.2*   PLT  236  251        Recent Labs      01/17/17   0625  01/15/17   1955   NA  138  139   K  3.9  3.9   GLU  133*  136*   BUN  9  15   CREA  0.63  0.68   CA  8.9  9.6       RECOMMENDATIONS AND DISCHARGE PLANNING     1. Pending tests/procedures/ Plan of Care or Other Needs: PT/OT, pain management     2. Discharge plan for patient and Needs/Barriers:     3. Estimated Discharge Date: 1/19/17 Posted on Whiteboard in [facility]: yes      4. The patient's care plan was reviewed with the oncoming nurse.        \"HEALS\" SAFETY CHECK      Fall Risk    Total Score: 3    Safety Measures: Safety Measures: Bed/Chair-Wheels locked, Bed in low position, Caregiver at bedside, Call light within reach, Gripper socks, Fall prevention (comment)    A safety check occurred in the patient's room between off going nurse and oncoming nurse listed above. The safety check included the below items  Area Items   H  High Alert Medications - Verify all high alert medication drips (heparin, PCA, etc.)   E  Equipment - Suction is set up for ALL patients (with dae)  - Red plugs utilized for all equipment (IV pumps, etc.)  - WOWs wiped down at end of shift.  - Room stocked with oxygen, suction, and other unit-specific supplies   A  Alarms - Bed alarm is set for fall risk patients  - Ensure chair alarm is in place and activated if patient is up in a chair   L  Lines - Check IV for any infiltration  - [place] bag is empty if patient has a [place]   - Tubing and IV bags are labeled   S  Safety   - Room is clean, patient is clean, and equipment is clean. - Hallways are clear from equipment besides carts. - Fall bracelet on for fall risk patients  - Ensure room is clear and free of clutter  - Suction is set up for ALL patients (with dae)  - Hallways are clear from equipment besides carts.    - Isolation precautions followed, supplies available outside room, sign posted     Zainab Duarte RN

## 2017-01-19 ENCOUNTER — APPOINTMENT (OUTPATIENT)
Dept: GENERAL RADIOLOGY | Age: 77
End: 2017-01-19
Attending: HOSPITALIST
Payer: MEDICARE

## 2017-01-19 VITALS
RESPIRATION RATE: 17 BRPM | SYSTOLIC BLOOD PRESSURE: 133 MMHG | HEIGHT: 55 IN | HEART RATE: 67 BPM | TEMPERATURE: 97.6 F | DIASTOLIC BLOOD PRESSURE: 76 MMHG | WEIGHT: 120 LBS | OXYGEN SATURATION: 96 %

## 2017-01-19 PROCEDURE — 74011250636 HC RX REV CODE- 250/636: Performed by: HOSPITALIST

## 2017-01-19 PROCEDURE — 99218 HC RM OBSERVATION: CPT

## 2017-01-19 PROCEDURE — 74011250637 HC RX REV CODE- 250/637: Performed by: HOSPITALIST

## 2017-01-19 PROCEDURE — 97530 THERAPEUTIC ACTIVITIES: CPT

## 2017-01-19 PROCEDURE — 97116 GAIT TRAINING THERAPY: CPT

## 2017-01-19 PROCEDURE — 73562 X-RAY EXAM OF KNEE 3: CPT

## 2017-01-19 PROCEDURE — 96372 THER/PROPH/DIAG INJ SC/IM: CPT

## 2017-01-19 RX ORDER — ONDANSETRON 4 MG/1
4 TABLET, ORALLY DISINTEGRATING ORAL
Qty: 15 TAB | Refills: 0 | Status: SHIPPED | OUTPATIENT
Start: 2017-01-19

## 2017-01-19 RX ORDER — ACETAMINOPHEN 500 MG
1000 TABLET ORAL
Qty: 60 TAB | Refills: 0 | Status: SHIPPED | OUTPATIENT
Start: 2017-01-19

## 2017-01-19 RX ORDER — LORAZEPAM 0.5 MG/1
0.5 TABLET ORAL
Qty: 30 TAB | Refills: 0 | Status: SHIPPED | OUTPATIENT
Start: 2017-01-19

## 2017-01-19 RX ORDER — SENNOSIDES 8.6 MG/1
2 TABLET ORAL
Qty: 60 TAB | Refills: 0 | Status: SHIPPED | OUTPATIENT
Start: 2017-01-19

## 2017-01-19 RX ORDER — TRAMADOL HYDROCHLORIDE 50 MG/1
50 TABLET ORAL
Qty: 30 TAB | Refills: 0 | Status: SHIPPED | OUTPATIENT
Start: 2017-01-19

## 2017-01-19 RX ADMIN — ASPIRIN 81 MG: 81 TABLET, COATED ORAL at 09:31

## 2017-01-19 RX ADMIN — LOSARTAN POTASSIUM 100 MG: 25 TABLET, FILM COATED ORAL at 09:31

## 2017-01-19 RX ADMIN — TRAMADOL HYDROCHLORIDE 50 MG: 50 TABLET, FILM COATED ORAL at 13:37

## 2017-01-19 RX ADMIN — Medication 10 ML: at 05:14

## 2017-01-19 RX ADMIN — LEVOTHYROXINE SODIUM 50 MCG: 50 TABLET ORAL at 05:11

## 2017-01-19 RX ADMIN — TRAMADOL HYDROCHLORIDE 50 MG: 50 TABLET, FILM COATED ORAL at 05:11

## 2017-01-19 RX ADMIN — PRAVASTATIN SODIUM 20 MG: 20 TABLET ORAL at 09:30

## 2017-01-19 RX ADMIN — HEPARIN SODIUM 5000 UNITS: 5000 INJECTION, SOLUTION INTRAVENOUS; SUBCUTANEOUS at 09:31

## 2017-01-19 RX ADMIN — ATENOLOL 25 MG: 50 TABLET ORAL at 09:31

## 2017-01-19 NOTE — PROGRESS NOTES
Bedside and Verbal shift change report given to Brigitte Vela RN (oncoming nurse) by Kamilah Esposito RN (offgoing nurse). Report included the following information SBAR, Kardex, MAR and Recent Results.     SITUATION:    Code Status: Full Code  Reason for Admission: Impaired gait and mobility   Effusion of knee joint right    [facility] day: 0   Problem List:       Hospital Problems  Date Reviewed: 1/15/2017          Codes Class Noted POA    Primary osteoarthritis of right knee ICD-10-CM: M17.11  ICD-9-CM: 715.16  1/16/2017 Yes        Knee strain ICD-10-CM: M15.020V  ICD-9-CM: 844.9  1/16/2017 Unknown        Impaired gait and mobility ICD-10-CM: R26.89  ICD-9-CM: 781.2  1/15/2017 Yes    Overview Signed 1/15/2017  6:41 AM by Chang Smith, DO     Unable to ambulate, Pain on walking after fall at home onto right knee             * (Principal)Effusion of knee joint right ICD-10-CM: M25.461  ICD-9-CM: 719.06  1/15/2017 Yes        Acute pain of right knee ICD-10-CM: M25.561  ICD-9-CM: 719.46  1/15/2017 Yes        Essential hypertension ICD-10-CM: I10  ICD-9-CM: 401.9  1/15/2017 Yes        GERD (gastroesophageal reflux disease) ICD-10-CM: K21.9  ICD-9-CM: 530.81  Unknown Yes        Hypothyroid ICD-10-CM: E03.9  ICD-9-CM: 244.9  Unknown Yes              BACKGROUND:    Past Medical History:   Past Medical History   Diagnosis Date    GERD (gastroesophageal reflux disease)     High cholesterol     Hypertension     Hypothyroid          Patient taking anticoagulants yes    ASSESSMENT:    Changes in Assessment Throughout Shift: none     Patient has Central Line: no Reasons if yes:      Patient has Reno Cath: no Reasons if yes:          Last Vitals:     Vitals:    01/18/17 0745 01/18/17 1353 01/18/17 1646 01/18/17 2053   BP: 121/69 120/67 131/77 116/65   Pulse: 62 66 80 68   Resp: 18 18 18 18   Temp: 99.2 °F (37.3 °C) 97.8 °F (36.6 °C) 97.6 °F (36.4 °C) 97.8 °F (36.6 °C)   SpO2: 96% 98% 95% 96%   Weight:       Height:  IV and DRAINS (will only show if present)   [REMOVED] Peripheral IV 01/15/17 Right Antecubital-Site Assessment: Clean, dry, & intact  [REMOVED] Peripheral IV 01/15/17 Right Arm-Site Assessment: Clean, dry, & intact  Peripheral IV 01/18/17 Left Antecubital-Site Assessment: Clean, dry, & intact     WOUND (if present)   Wound Type:  none   Dressing present Dressing Present : No   Wound Concerns/Notes:  none     PAIN    Pain Assessment    Pain Intensity 1: 0 (01/19/17 0000)    Pain Location 1: Knee    Pain Intervention(s) 1: Medication (see MAR), Sitz bath    Patient Stated Pain Goal: 0  o Interventions for Pain:  medication  o Intervention effective: yes  o Time of last intervention: see mar   o Reassessment Completed: yes      Last 3 Weights:  Last 3 Recorded Weights in this Encounter    01/15/17 0143   Weight: 54.4 kg (120 lb)     Weight change:      INTAKE/OUPUT    Current Shift: 01/18 1901 - 01/19 0700  In: -   Out: 5482 [Urine:1650]    Last three shifts: 01/17 0701 - 01/18 1900  In: 460 [P.O.:460]  Out: 2500 [Urine:2500]     LAB RESULTS     Recent Labs      01/17/17   0625  01/16/17   1133   WBC  9.0  6.7   HGB  10.7*  11.0*   HCT  32.9*  34.2*   PLT  236  251        Recent Labs      01/17/17   0625   NA  138   K  3.9   GLU  133*   BUN  9   CREA  0.63   CA  8.9       RECOMMENDATIONS AND DISCHARGE PLANNING     1. Pending tests/procedures/ Plan of Care or Other Needs: PT/OT, pain management     2. Discharge plan for patient and Needs/Barriers:     3. Estimated Discharge Date: 1/19/17 Posted on Whiteboard in [facility]: yes      4. The patient's care plan was reviewed with the oncoming nurse. \"HEALS\" SAFETY CHECK      Fall Risk    Total Score: 2    Safety Measures: Safety Measures: Bed/Chair-Wheels locked, Bed in low position, Caregiver at bedside, Call light within reach    A safety check occurred in the patient's room between off going nurse and oncoming nurse listed above.     The safety check included the below items  Area Items   H  High Alert Medications - Verify all high alert medication drips (heparin, PCA, etc.)   E  Equipment - Suction is set up for ALL patients (with dae)  - Red plugs utilized for all equipment (IV pumps, etc.)  - WOWs wiped down at end of shift.  - Room stocked with oxygen, suction, and other unit-specific supplies   A  Alarms - Bed alarm is set for fall risk patients  - Ensure chair alarm is in place and activated if patient is up in a chair   L  Lines - Check IV for any infiltration  - Reno bag is empty if patient has a Reno   - Tubing and IV bags are labeled   S  Safety   - Room is clean, patient is clean, and equipment is clean. - Hallways are clear from equipment besides carts. - Fall bracelet on for fall risk patients  - Ensure room is clear and free of clutter  - Suction is set up for ALL patients (with dae)  - Hallways are clear from equipment besides carts.    - Isolation precautions followed, supplies available outside room, sign posted     Suzon Sandhoff, RN

## 2017-01-19 NOTE — DISCHARGE SUMMARY
Discharge Summary    Patient: Barbara Floresr               Sex: female          DOA: 1/15/2017         YOB: 1940      Age:  77 y.o.        LOS:  LOS: 0 days                Admit Date: 1/15/2017    Discharge Date: 1/19/2017    Admission Diagnoses: Impaired gait and mobility  Effusion of knee joint right    Discharge Diagnoses:    Right knee contusion  Effusion of right knee  Osteoarthritis chondrocalcinosis both knees    Problem List as of 1/19/2017  Date Reviewed: 1/19/2017          Codes Class Noted - Resolved    Primary osteoarthritis of right knee ICD-10-CM: M17.11  ICD-9-CM: 715.16  1/16/2017 - Present        Knee strain ICD-10-CM: M03.201R  ICD-9-CM: 844.9  1/16/2017 - Present        Impaired gait and mobility ICD-10-CM: R26.89  ICD-9-CM: 781.2  1/15/2017 - Present    Overview Signed 1/15/2017  6:41 AM by Liu Edge, DO     Unable to ambulate, Pain on walking after fall at home onto right knee             * (Principal)Effusion of knee joint right ICD-10-CM: M25.461  ICD-9-CM: 719.06  1/15/2017 - Present        Acute pain of right knee ICD-10-CM: M25.561  ICD-9-CM: 719.46  1/15/2017 - Present        Essential hypertension ICD-10-CM: I10  ICD-9-CM: 401.9  1/15/2017 - Present        GERD (gastroesophageal reflux disease) ICD-10-CM: K21.9  ICD-9-CM: 530.81  Unknown - Present        Hypothyroid ICD-10-CM: E03.9  ICD-9-CM: 244.9  Unknown - Present              Discharge Condition: Good    Hospital Course:   Manuella Fabry is a 77year old female who was presented to the ER on [place] 1/15/17 unable to walk on her right leg after a fall at home 1/13/17. Xray with questionable fracture not seen on CT at tibial plateau. Dr Eduardo Lopez evaluated the patient and for contusion and effusion;   arranged for a hinged right knee brace for 1/16/17. Plan was keep for observation pending PT recommendations, and training in rolling walker. PT consult changed to stat at noon time 1/16/17.    PT evaluated patient 10 AM 1/17/17, and recommended rehab, clinicals submitted to CM 1PM, OT deferred plan to PT and submitted clinicals to  16:50 PM 1/17/17. The  processed the clinical to [facility] rehab 1/17/17. On 1/18/17 Ms. Rosario showed improvement and upgraded to [address] with PT. She did report pain starting in her Left knee and a plain film revealed OA with mild effusion. Today, she feels well and wants to go home. Physical  Therapy has worked with her further, and cleared her to use the steps at home with hinged right knee brace. DME recommended and RX Rolling Walker, BSC and Shower chair. The patient was discharged in good condition to her daughter,   For follow up with Dr Ministerio Freedman tomorrow, and Orthopedics, Dr. Ramírez Santos in a week. Consults: Orthopedics    Significant Diagnostic Studies:   radiology: CT scan:   IMPRESSION  Impression:      Large suprapatellar joint effusion of the right knee. No evidence of fracture. Internal derangement not excluded with this technique.     Multicompartmental osteoarthrosis. Chondrocalcinosis.            Imaging      CT KNEE RT WO CONT (Order #[id number]) on 1/15/2017 - Imaging Information       IMPRESSION:     Moderate knee joint effusion. Moderate osteoarthritis. Chondrocalcinosis.       Imaging      XR KNEE LT 3 V (Order #[id number]) on 1/18/2017 - Imaging Information     Results for Hieu Antoine (MRN [id number]) as of 1/20/2017 03:57   Ref.  Range 1/15/2017 01:55 1/16/2017 11:33 1/17/2017 06:25   WBC Latest Ref Range: 4.6 - 13.2 K/uL 9.6 6.7 9.0   RBC Latest Ref Range: 4.20 - 5.30 M/uL 4.65 4.20 4.04 (L)   HGB Latest Ref Range: 12.0 - 16.0 g/dL 12.5 11.0 (L) 10.7 (L)   HCT Latest Ref Range: 35.0 - 45.0 % 36.9 34.2 (L) 32.9 (L)   MCV Latest Ref Range: 74.0 - 97.0 FL 79.4 81.4 81.4   MCH Latest Ref Range: 24.0 - 34.0 PG 26.9 26.2 26.5   MCHC Latest Ref Range: 31.0 - 37.0 g/dL 33.9 32.2 32.5   RDW Latest Ref Range: 11.6 - 14.5 % 15.2 (H) 15.7 (H) 15.4 (H)   PLATELET Latest Ref Range: 135 - 420 K/uL 257 251 236     Results for Mark Landin (MRN [id number]) as of 1/20/2017 03:57   Ref. Range 1/17/2017 06:25   Sodium Latest Ref Range: 136 - 145 mmol/L 138   Potassium Latest Ref Range: 3.5 - 5.5 mmol/L 3.9   Chloride Latest Ref Range: 100 - 108 mmol/L 108   CO2 Latest Ref Range: 21 - 32 mmol/L 22   Anion gap Latest Ref Range: 3.0 - 18 mmol/L 8   Glucose Latest Ref Range: 74 - 99 mg/dL 133 (H)   BUN Latest Ref Range: 7.0 - 18 MG/DL 9   Creatinine Latest Ref Range: 0.6 - 1.3 MG/DL 0.63   BUN/Creatinine ratio Latest Ref Range: 12 - 20   14   Calcium Latest Ref Range: 8.5 - 10.1 MG/DL 8.9   GFR est non-AA Latest Ref Range: >60 ml/min/1.73m2 >60   GFR est AA Latest Ref Range: >60 ml/min/1.73m2 >60       Results for Mark Landin (MRN [id number]) as of 1/20/2017 03:57   Ref. Range 1/17/2017 06:25   Bilirubin, total Latest Ref Range: 0.2 - 1.0 MG/DL 0.2   Protein, total Latest Ref Range: 6.4 - 8.2 g/dL 6.5   Albumin Latest Ref Range: 3.4 - 5.0 g/dL 2.8 (L)   Globulin Latest Ref Range: 2.0 - 4.0 g/dL 3.7   A-G Ratio Latest Ref Range: 0.8 - 1.7   0.8   ALT Latest Ref Range: 13 - 56 U/L 69 (H)   AST Latest Ref Range: 15 - 37 U/L 55 (H)   Alk. phosphatase Latest Ref Range: 45 - 117 U/L 129 (H)     Results for Mark Landin (MRN [id number]) as of 1/20/2017 03:57   Ref. Range 1/15/2017 01:55   INR Latest Ref Range: 0.8 - 1.2   1.0   Prothrombin time Latest Ref Range: 11.5 - 15.2 sec 12.8       Discharge Medications:     Current Discharge Medication List      START taking these medications    Details   LORazepam (ATIVAN) 0.5 mg tablet Take 1 Tab by mouth every six (6) hours as needed. Max Daily Amount: 2 mg. Indications: ANXIETY  Qty: 30 Tab, Refills: 0      ondansetron (ZOFRAN ODT) 4 mg disintegrating tablet Take 1 Tab by mouth every eight (8) hours as needed. Qty: 15 Tab, Refills: 0      senna (SENOKOT) 8.6 mg tablet Take 2 Tabs by mouth nightly.   Qty: 60 Tab, Refills: 0      traMADol (ULTRAM) 50 mg tablet Take 1 Tab by mouth every six (6) hours as needed. Max Daily Amount: 200 mg. Indications: PAIN  Qty: 30 Tab, Refills: 0         CONTINUE these medications which have CHANGED    Details   acetaminophen (TYLENOL EXTRA STRENGTH) 500 mg tablet Take 2 Tabs by mouth three (3) times daily as needed. Indications: ARTHRITIC PAIN  Qty: 60 Tab, Refills: 0         CONTINUE these medications which have NOT CHANGED    Details   amLODIPine (NORVASC) 10 mg tablet Take 1 Tab by mouth daily. atenolol (TENORMIN) 25 mg tablet Take 1 Tab by mouth daily. losartan (COZAAR) 100 mg tablet Take 1 Tab by mouth daily. pravastatin (PRAVACHOL) 20 mg tablet Take 1 Tab by mouth daily. aspirin delayed-release 81 mg tablet Take 81 mg by mouth daily. levothyroxine (SYNTHROID) 50 mcg tablet Take 50 mcg by mouth Daily (before breakfast). omeprazole (PRILOSEC) 20 mg capsule Take 20 mg by mouth two (2) times a day. fluticasone (FLONASE) 50 mcg/actuation nasal spray 2 Sprays by Both Nostrils route daily. alendronate (FOSAMAX) 70 mg tablet Take 70 mg by mouth every seven (7) days. Cholecalciferol, Vitamin D3, 50,000 unit cap Take 1 Cap by mouth every seven (7) days. diclofenac (VOLTAREN) 1 % gel Apply 2 g to affected area two (2) times a day. Indications: OSTEOARTHRITIS OF THE KNEE, left knee             Activity: Activity as tolerated and no driving for today, PT/OT Eval and Treat and PT/OT per Home Health    Diet: Regular Diet    Wound Care: None needed    Follow-up: 1/20/2017 MD Dr. Francie Hagen, in a week.       Dennie Radish, [address]  1/19/2017  5:45 PM

## 2017-01-19 NOTE — PROGRESS NOTES
Problem: Mobility Impaired (Adult and Pediatric)  Goal: *Acute Goals and Plan of Care (Insert Text)  STGs to be addressed within 3 days:  1. Bed mobility: Supine to sit to supine S with HR for meals. 2. Activity tolerance: Tolerate up in chair 1-2 hrs for ADLs. 3. Transfers: Sit to stand to chair S with RW/KI for ADLs. LTGs to be addressed within 7 days:  1. Standing/Ambulation Balance: Increase to Good with RW/KI for safe transfers and gait. 2. Ambulation: Ambulate > 100 ft. S, WBAT R LE with RW/KI for home mobility. 3. Patient Education: Independent with HEP for home safety. Added stair training goal 1/19/17    1. Stair negotiation: Patient to ascend/descend 4 steps min A with B HR for home entry. Outcome: Progressing Towards Goal  PHYSICAL THERAPY TREATMENT     Patient: Diane Gama (89 y.o. female)  Date: 1/19/2017  Diagnosis: Impaired gait and mobility  Effusion of knee joint right Effusion of knee joint right       Precautions: Fall, WBAT (R LE, KI in place)  Chart, physical therapy assessment, plan of care and goals were reviewed. ASSESSMENT:  Pt very motivated today, daughter present throughout treatment to aid with translation during mobility as well as for instruction on stair training as primary caregiver for patient. Pt does well during gait training within room CGA/SBA with RW; supervision for sit<>stand transfers. Instructed pt on 4 stairs with visual demonstrations as well as verbal cues conveyed by daughter for safety and sequencing. Provided daughter with written handout on stair sequencing. Pt states she would like to be upstairs at home in order to be able to bathe; daughter states she feels confident with instructions and that she would be able to assist patient into the home and upstairs to her bedroom, patient concurs.    Progression toward goals:  [X]      Improving appropriately and progressing toward goals  [ ]      Improving slowly and progressing toward goals  [ ] Not making progress toward goals and plan of care will be adjusted       PLAN:  Patient continues to benefit from skilled intervention to address the above impairments. Continue treatment per established plan of care. Discharge Recommendations:  Home Health  Further Equipment Recommendations for Discharge:  bedside commode, shower chair and rolling walker       SUBJECTIVE:   Patient stated I want to go upstairs.  (as translated by daughter)  Mobility  Current  CJ= 20-39%   Goal  CI= 1-19%. The severity rating is based on the Other Level of assist required for functional mobility         OBJECTIVE DATA SUMMARY:   Critical Behavior:  Neurologic State: Alert  Orientation Level: Oriented X4  Cognition: Appropriate decision making, Appropriate for age attention/concentration, Appropriate safety awareness  Safety/Judgement: Decreased insight into deficits, Decreased awareness of need for assistance, Decreased awareness of need for safety, Fall prevention  Functional Mobility Training:  Bed Mobility:  Rolling: Supervision  Supine to Sit: Supervision  Sit to Supine: Supervision  Transfers:  Sit to Stand: Stand-by asssistance  Stand to Sit: Stand-by asssistance  Balance:  Sitting: Intact  Standing: Impaired  Standing - Static: Good  Standing - Dynamic : Fair  Ambulation/Gait Training:  Distance (ft): 10 Feet (ft) (X3)  Assistive Device: Brace/Splint; Walker, rolling  Ambulation - Level of Assistance: Contact guard assistance  Gait Abnormalities: Antalgic;Decreased step clearance; Step to gait  Base of Support: Center of gravity altered  Speed/Zuleika: Slow;Shuffled  Stairs:  Number of Stairs Trained: 4  Stairs - Level of Assistance: Minimum assistance  Therapeutic Exercises: Ankle pumps  Pain:  Pt pain was reported as  6 pre-treatment. Pt pain was reported as 8 post-treatment.   Intervention: nurse notified     Activity Tolerance:   Fair   Please refer to the flowsheet for vital signs taken during this treatment.   After treatment:   [ ] Patient left in no apparent distress sitting up in chair  [X] Patient left in no apparent distress in bed  [X] Call bell left within reach  [X] Nursing notified  [X] Caregiver present  [ ] Bed alarm activated      Chandan Parker PTA   Time Calculation: 24 mins

## 2017-01-19 NOTE — ROUTINE PROCESS
2000 Patient is alert and oriented times three with no signs or symptoms of distress. 0000 No signs or symptoms of distress at this time  0400 Patient is alert and oriented times three no signs or symptoms of distress.

## 2017-01-19 NOTE — PROGRESS NOTES
Discussed pending auth with transition team. Patient was improved on PT f/u assessment and has been recommended home health or rehab, and rehab  auth declined. Discussed [place] situation with daughter, Erica Kras [phone], and recommended a hospital bed for first floor. Ms. Hillary Rodríguez states she discussed this the other day with her mom when we expected her to go home. Zonia Herr prefers to be upstairs with her own bedroom and the shower bath. Ms. Hillary Rodríguez states they can accommodate whatever she needs- upstairs. ..they will just need a way to get her up the 13 steps when she is sent home. Trung Tran is checking for any secondary coverage she may have for this transfer. I believe the patient will be safer on the main floor and defer showering until able to do steps. I spoke with PT and requested assessment for steps and any further   recommendations for home.     Maria C Chua

## 2017-01-20 NOTE — PROGRESS NOTES
Pt received her RW yesterday. Catia from [address] [place] [address] is ordering pt her [facility] and a shower chair.

## 2018-01-03 ENCOUNTER — HOSPITAL ENCOUNTER (OUTPATIENT)
Dept: MAMMOGRAPHY | Age: 78
Discharge: HOME | End: 2018-01-03
Attending: FAMILY MEDICINE
Payer: MEDICARE

## 2018-01-03 DIAGNOSIS — Z12.31 VISIT FOR SCREENING MAMMOGRAM: ICD-10-CM

## 2018-01-03 PROCEDURE — 77067 SCR MAMMO BI INCL CAD: CPT

## 2019-01-07 ENCOUNTER — HOSPITAL ENCOUNTER (OUTPATIENT)
Dept: MAMMOGRAPHY | Age: 79
Discharge: HOME | End: 2019-01-07
Attending: FAMILY MEDICINE
Payer: MEDICARE

## 2019-01-07 DIAGNOSIS — Z12.31 VISIT FOR SCREENING MAMMOGRAM: ICD-10-CM

## 2019-01-07 PROCEDURE — 77063 BREAST TOMOSYNTHESIS BI: CPT

## 2019-06-13 NOTE — PROGRESS NOTES
conducted an initial consultation and Spiritual Assessment for Hasmukh Vázquez, who is a 77 y.o.,female. Patients Primary Language is: [place]. According to the patients EMR Taoist Affiliation is: No Hindu. The reason the Patient came to the hospital is:   Patient Active Problem List    Diagnosis Date Noted    Primary osteoarthritis of right knee 01/16/2017    Knee strain 01/16/2017    Impaired gait and mobility 01/15/2017    Effusion of knee joint right 01/15/2017    Acute pain of right knee 01/15/2017    Essential hypertension 01/15/2017    GERD (gastroesophageal reflux disease)     Hypothyroid         The  provided the following Interventions:  Initiated a relationship of care and support. Explored issues of chanell, belief, spirituality and Uatsdin/ritual needs while hospitalized. Listened empathically. Provided chaplaincy education. Provided information about Spiritual Care Services. Offered prayer and assurance of continued prayers on patient's behalf. Chart reviewed. The following outcomes where achieved:  Patient shared limited information about both their medical narrative and spiritual journey/beliefs.  confirmed Patient's Taoist Affiliation. Patient processed feeling about current hospitalization. Patient expressed gratitude for 's visit. Assessment:  Patient does not have any Uatsdin/cultural needs that will affect patients preferences in health care. There are no spiritual or Uatsdin issues which require intervention at this time. Plan:  Chaplains will continue to follow and will provide pastoral care on an as needed/requested basis.  recommends bedside caregivers page  on duty if patient shows signs of acute spiritual or emotional distress.       89 y 252  [phone] [de-identified] : s/p ORIF right tibial plateau on 04/30/2019 [de-identified] : The patient returns for the 2nd postoperative visit after undergoing ORIF right tibial plateau at [facility]. Patient was brought into the ED after being transferred from [facility] with a history of a fall at home. Xrays revealed a right bicondylar tibial plateau fracture. The surgery was on 04/30/2019. She reports mild postoperative pain. The patient is recovering at home. She is NWB in a wheelchair with a knee immobilizer. Patient states that she returned to the ER the other day due to an infection and was treated with antibiotics PO. She denies N/V/D, fever and chills.  [de-identified] : AP, lateral, skyline, and tunnel views of the right knee were obtained and revealed a well-aligned tibial plateau fracture with a plate and multiple screws in place.  [de-identified] : Patient is WDWN, alert, and in no acute distress. Breathing is unlabored. She is grossly oriented to person, place, and time. \par \par Incision is healed. There are no signs of infection. Sutures were removed. Edema and tenderness are present. \par ROM: 0-60 °  [de-identified] : Patient was advised  to proceed with PT to 50% weight bearing on the right leg. A script was provided. Patient may progress to a walker. Massage the scar with vitamin E oil. Gentle range of motion exercises were encouraged. Follow up in 6 weeks.4 view knee  Xrays upon return.

## 2023-04-19 NOTE — PROGRESS NOTES
Patient re-evaluated in [facility] bed 3, ER. Dinner tray at bedside, 1/2 eaten, emesis in green bag,   undigested food. Patient denies abdominal pain, headache or chest pain at this time. \"I just throw up. \"  Events of day reviewed.  evaluated patient, cleared for weight bearing as tolerated this am with knee brace on. PT did not see patient to evaluate for needed DME, walker vs wheel chair for home. Patient is alert and oriented  Speech is clear, no focal deficits, oral membranes dry, Neck is supple, no adenopathy, no JVD  Lungs remain clear, HR reg, bradycardia 58  Abdomen is soft, non tender, BS+, no guarding, no rebound, no bruit  Ext: right knee immobilizer over effusion right knee, TEDS not on as ordered.   Calves non tender, + pulses    Vomiting:   start IV replacement D5NSS at 125 cc hr  Zofran 4 mg IV q8h prn  Check LFT's, lipase, ua  EKG, CE  Monitor    Isis Morin, [address] patient